# Patient Record
Sex: FEMALE | Race: WHITE | Employment: UNEMPLOYED | URBAN - METROPOLITAN AREA
[De-identification: names, ages, dates, MRNs, and addresses within clinical notes are randomized per-mention and may not be internally consistent; named-entity substitution may affect disease eponyms.]

---

## 2021-03-09 ENCOUNTER — OFFICE VISIT (OUTPATIENT)
Dept: FAMILY MEDICINE CLINIC | Facility: CLINIC | Age: 61
End: 2021-03-09
Payer: COMMERCIAL

## 2021-03-09 VITALS
WEIGHT: 119 LBS | HEIGHT: 63.5 IN | BODY MASS INDEX: 20.82 KG/M2 | OXYGEN SATURATION: 98 % | DIASTOLIC BLOOD PRESSURE: 64 MMHG | SYSTOLIC BLOOD PRESSURE: 120 MMHG | HEART RATE: 78 BPM

## 2021-03-09 DIAGNOSIS — M54.50 ACUTE RIGHT-SIDED LOW BACK PAIN WITHOUT SCIATICA: ICD-10-CM

## 2021-03-09 DIAGNOSIS — M50.30 DDD (DEGENERATIVE DISC DISEASE), CERVICAL: ICD-10-CM

## 2021-03-09 DIAGNOSIS — M79.18 CERVICAL MYOFASCIAL PAIN SYNDROME: Primary | ICD-10-CM

## 2021-03-09 PROCEDURE — 3078F DIAST BP <80 MM HG: CPT | Performed by: FAMILY MEDICINE

## 2021-03-09 PROCEDURE — 99204 OFFICE O/P NEW MOD 45 MIN: CPT | Performed by: FAMILY MEDICINE

## 2021-03-09 PROCEDURE — 3074F SYST BP LT 130 MM HG: CPT | Performed by: FAMILY MEDICINE

## 2021-03-09 PROCEDURE — 3008F BODY MASS INDEX DOCD: CPT | Performed by: FAMILY MEDICINE

## 2021-03-09 RX ORDER — PREGABALIN 75 MG/1
75 CAPSULE ORAL
COMMUNITY
End: 2021-03-09

## 2021-03-09 RX ORDER — PREGABALIN 75 MG/1
75 CAPSULE ORAL DAILY
Qty: 90 CAPSULE | Refills: 1 | Status: SHIPPED | OUTPATIENT
Start: 2021-03-09 | End: 2021-09-01

## 2021-03-09 NOTE — PROGRESS NOTES
HPI:    Patient ID: Avi Drew is a 56year old female who presents for refill of pregabalin. HPI  Started seeing Dr. Harold Mcardle in 2010 for adjustments. Then started [place] physiatrist [place] (Dr. Kin Sanches).   Was initially started on lyrica, t PRILOSEC OTC OR None Entered (Patient not taking:  )     • GOLYTELY 236 GM OR SOLR TAKE AS INSTRUCTED FROM OFFICE DIRECTIONS (Patient not taking: Reported on 3/9/2021) 4000 mL 0        Cipro Xr [Ciproflox*        Review of Systems   Constitutional: Chelo Lockhart Coordination: Coordination normal.      Gait: Gait normal.   Psychiatric:         Thought Content:  Thought content normal.         Judgment: Judgment normal.             ASSESSMENT/PLAN:   Cervical myofascial pain syndrome  (primary encounter diagnosis)  A

## 2021-03-10 ENCOUNTER — TELEPHONE (OUTPATIENT)
Dept: FAMILY MEDICINE CLINIC | Facility: CLINIC | Age: 61
End: 2021-03-10

## 2021-03-10 NOTE — TELEPHONE ENCOUNTER
Called pt and was informed of Advil 600 mg to 800 mg. Pt wants to know how often and for how long. Reviewed chart and not noted. Pt also wants a muscle relaxant therefore will forward to Dr. Nisha Bowman.   Informed pt that will route message to Dr. Nisha Bowman and

## 2021-03-10 NOTE — TELEPHONE ENCOUNTER
Was seen in office yesterday. Has questions about taking advil and how often she needs to take and for how long. Patient also feels she needs muscle relaxant.

## 2021-03-10 NOTE — TELEPHONE ENCOUNTER
Jamie Peña, DO  Leif 10 Dr. Johnnie Breaux 5 minutes ago (4:11 PM)     Advil: She can take 600-800 mg every 8 hours as needed.      Muscle relaxer: I would start with the regular advil first. Muscle relaxers often don't help too much and can make you over

## 2021-05-28 ENCOUNTER — OFFICE VISIT (OUTPATIENT)
Dept: FAMILY MEDICINE CLINIC | Facility: CLINIC | Age: 61
End: 2021-05-28
Payer: COMMERCIAL

## 2021-05-28 VITALS
OXYGEN SATURATION: 99 % | HEART RATE: 67 BPM | WEIGHT: 119 LBS | DIASTOLIC BLOOD PRESSURE: 60 MMHG | BODY MASS INDEX: 20.82 KG/M2 | HEIGHT: 63.5 IN | SYSTOLIC BLOOD PRESSURE: 120 MMHG

## 2021-05-28 DIAGNOSIS — Z12.31 ENCOUNTER FOR SCREENING MAMMOGRAM FOR MALIGNANT NEOPLASM OF BREAST: ICD-10-CM

## 2021-05-28 DIAGNOSIS — Z12.11 COLON CANCER SCREENING: ICD-10-CM

## 2021-05-28 DIAGNOSIS — Z80.0 FAMILY HISTORY OF COLON CANCER IN FATHER: ICD-10-CM

## 2021-05-28 DIAGNOSIS — R20.0 NUMBNESS AND TINGLING IN RIGHT HAND: Primary | ICD-10-CM

## 2021-05-28 DIAGNOSIS — R20.2 NUMBNESS AND TINGLING IN RIGHT HAND: Primary | ICD-10-CM

## 2021-05-28 PROCEDURE — 3074F SYST BP LT 130 MM HG: CPT | Performed by: FAMILY MEDICINE

## 2021-05-28 PROCEDURE — 3078F DIAST BP <80 MM HG: CPT | Performed by: FAMILY MEDICINE

## 2021-05-28 PROCEDURE — 3008F BODY MASS INDEX DOCD: CPT | Performed by: FAMILY MEDICINE

## 2021-05-28 PROCEDURE — 99214 OFFICE O/P EST MOD 30 MIN: CPT | Performed by: FAMILY MEDICINE

## 2021-05-28 NOTE — PROGRESS NOTES
HPI:    Patient ID: Nikole Cobos is a 56year old female who presents for numbness of right hand, colonoscopy, and mammogram.    HPI  Recently went to new doctor who filled in for Dr. Mallorie Guerrero. Saw him for right hand numbness and tingling.  This doc ordere Reported on 3/9/2021) 4000 mL 0        Cipro Xr [Ciproflox*        Review of Systems   Musculoskeletal: Positive for neck pain and neck stiffness. Neurological: Positive for numbness. Negative for weakness. All other systems reviewed and are negative. mammogram for malignant neoplasm of breast    1. Numbness and tingling in right hand  - EMG ([address]MON[address])  -Likely CTS. -Pt recently completed neuropathy & annual labs. Signed medical record release form today.  Will call pt once re

## 2021-06-01 PROBLEM — Z80.0 FAMILY HISTORY OF COLON CANCER IN FATHER: Status: ACTIVE | Noted: 2021-06-01

## 2021-06-04 ENCOUNTER — MED REC SCAN ONLY (OUTPATIENT)
Dept: FAMILY MEDICINE CLINIC | Facility: CLINIC | Age: 61
End: 2021-06-04

## 2021-07-24 ENCOUNTER — TELEPHONE (OUTPATIENT)
Dept: FAMILY MEDICINE CLINIC | Facility: CLINIC | Age: 61
End: 2021-07-24
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
## 2021-07-24 NOTE — TELEPHONE ENCOUNTER
Reviewed records from prior PCP and summarized below:      Normal pap in 04/2019. HepC Ab neg in 12/2018. Normal mammo 2016. MRI Right Shoulder 09/2013 w/ mild degenerative changes. MRI Cervical Spine 07/2010 w/ mild degenerative changes.

## 2021-08-03 ENCOUNTER — MED REC SCAN ONLY (OUTPATIENT)
Dept: FAMILY MEDICINE CLINIC | Facility: CLINIC | Age: 61
End: 2021-08-03

## 2021-09-01 RX ORDER — PREGABALIN 75 MG/1
75 CAPSULE ORAL DAILY
Qty: 90 CAPSULE | Refills: 0 | Status: SHIPPED | OUTPATIENT
Start: 2021-09-01 | End: 2021-12-01

## 2021-09-01 NOTE — TELEPHONE ENCOUNTER
A refill request was received for:  Requested Prescriptions     Pending Prescriptions Disp Refills   • PREGABALIN 75 MG Oral Cap [Pharmacy Med Name: Pregabalin 75 Mg Cap Nova] 90 capsule 0     Sig: Take 1 capsule (75 mg total) by mouth daily.      Last refi

## 2021-11-09 ENCOUNTER — PATIENT OUTREACH (OUTPATIENT)
Dept: FAMILY MEDICINE CLINIC | Facility: CLINIC | Age: 61
End: 2021-11-09

## 2021-12-01 RX ORDER — PREGABALIN 75 MG/1
75 CAPSULE ORAL DAILY
Qty: 90 CAPSULE | Refills: 0 | Status: SHIPPED | OUTPATIENT
Start: 2021-12-01 | End: 2022-03-02

## 2021-12-14 ENCOUNTER — OFFICE VISIT (OUTPATIENT)
Dept: FAMILY MEDICINE CLINIC | Facility: CLINIC | Age: 61
End: 2021-12-14
Payer: COMMERCIAL

## 2021-12-14 ENCOUNTER — LAB ENCOUNTER (OUTPATIENT)
Dept: LAB | Facility: REFERENCE LAB | Age: 61
End: 2021-12-14
Attending: FAMILY MEDICINE
Payer: COMMERCIAL

## 2021-12-14 VITALS
HEART RATE: 69 BPM | DIASTOLIC BLOOD PRESSURE: 58 MMHG | WEIGHT: 117 LBS | SYSTOLIC BLOOD PRESSURE: 122 MMHG | OXYGEN SATURATION: 98 % | BODY MASS INDEX: 20.47 KG/M2 | HEIGHT: 63.5 IN

## 2021-12-14 DIAGNOSIS — H93.12 TINNITUS OF LEFT EAR: ICD-10-CM

## 2021-12-14 DIAGNOSIS — R53.82 CHRONIC FATIGUE: Primary | ICD-10-CM

## 2021-12-14 DIAGNOSIS — R53.82 CHRONIC FATIGUE: ICD-10-CM

## 2021-12-14 DIAGNOSIS — F43.21 GRIEVING: ICD-10-CM

## 2021-12-14 DIAGNOSIS — Z78.9 VEGETARIAN: ICD-10-CM

## 2021-12-14 DIAGNOSIS — M65.341 TRIGGER RING FINGER OF RIGHT HAND: ICD-10-CM

## 2021-12-14 DIAGNOSIS — R20.0 NUMBNESS AND TINGLING IN RIGHT HAND: ICD-10-CM

## 2021-12-14 DIAGNOSIS — M79.18 CERVICAL MYOFASCIAL PAIN SYNDROME: ICD-10-CM

## 2021-12-14 DIAGNOSIS — R20.2 NUMBNESS AND TINGLING IN RIGHT HAND: ICD-10-CM

## 2021-12-14 PROCEDURE — 99214 OFFICE O/P EST MOD 30 MIN: CPT | Performed by: FAMILY MEDICINE

## 2021-12-14 PROCEDURE — 3078F DIAST BP <80 MM HG: CPT | Performed by: FAMILY MEDICINE

## 2021-12-14 PROCEDURE — 82607 VITAMIN B-12: CPT

## 2021-12-14 PROCEDURE — 84466 ASSAY OF TRANSFERRIN: CPT

## 2021-12-14 PROCEDURE — 3074F SYST BP LT 130 MM HG: CPT | Performed by: FAMILY MEDICINE

## 2021-12-14 PROCEDURE — 82746 ASSAY OF FOLIC ACID SERUM: CPT

## 2021-12-14 PROCEDURE — 83540 ASSAY OF IRON: CPT

## 2021-12-14 PROCEDURE — 82728 ASSAY OF FERRITIN: CPT

## 2021-12-14 PROCEDURE — 82306 VITAMIN D 25 HYDROXY: CPT

## 2021-12-14 PROCEDURE — 36415 COLL VENOUS BLD VENIPUNCTURE: CPT

## 2021-12-14 PROCEDURE — 3008F BODY MASS INDEX DOCD: CPT | Performed by: FAMILY MEDICINE

## 2021-12-14 PROCEDURE — 80050 GENERAL HEALTH PANEL: CPT | Performed by: FAMILY MEDICINE

## 2021-12-14 NOTE — PROGRESS NOTES
HPI:    Patient ID: Robert Simon is a 59year old female who presents for few concerns. HPI  Feel low mood. Low energy. Having brain fog and memory issues. Would like blood test.   Doesn't always sleep well. Wakes up in the middle of the night.  Can hav She is not in acute distress. Appearance: Normal appearance. She is well-developed. She is not ill-appearing, toxic-appearing or diaphoretic. HENT:      Head: Normocephalic and atraumatic.       Right Ear: Tympanic membrane, ear canal and external ear depressed mood. -Will check lab panel per request.   -Sees therapist once weekly. Will discuss pharmacotherapy pending labs. 2. Grieving  -Plan as above. 3. Numbness and tingling in right hand  -Chronic.  CTS vs cervical radiculopathy.   -Again enc Ambulatory

## 2022-01-07 ENCOUNTER — TELEPHONE (OUTPATIENT)
Dept: FAMILY MEDICINE CLINIC | Facility: CLINIC | Age: 62
End: 2022-01-07

## 2022-01-07 NOTE — TELEPHONE ENCOUNTER
Pt traveling to [place] in [place] and [facility] in February. Pt would like to know if there are any vaccinations or medications required for travel?

## 2022-01-10 NOTE — TELEPHONE ENCOUNTER
RN LMTCB with MP's message. ---------------------      Jose Jaeger 10 Dr. Elyse Wilhelm: Unspecified (3 days ago, 11:08 AM)  It will depend on where she is traveling to in [place] and [place].  The CDC lists all recommended vaccine

## 2022-01-14 ENCOUNTER — TELEPHONE (OUTPATIENT)
Dept: FAMILY MEDICINE CLINIC | Facility: CLINIC | Age: 62
End: 2022-01-14

## 2022-01-14 NOTE — TELEPHONE ENCOUNTER
The Insurance company needed the procedure code for an  EMG test ordered by Dr. Tamra Bowers. To check if the EMG is covered by the insurance.

## 2022-01-28 ENCOUNTER — TELEPHONE (OUTPATIENT)
Dept: FAMILY MEDICINE CLINIC | Facility: CLINIC | Age: 62
End: 2022-01-28

## 2022-01-28 NOTE — TELEPHONE ENCOUNTER
The patient is calling to inquire about a medication that her and the doctor discussed in December when she was there for an office visit. She only wanted to speak with Dr. Rula Holden and not the nurse.

## 2022-01-29 RX ORDER — FLUOXETINE 10 MG/1
10 TABLET, FILM COATED ORAL DAILY
Qty: 60 TABLET | Refills: 0 | Status: SHIPPED | OUTPATIENT
Start: 2022-01-29 | End: 2022-02-01

## 2022-01-29 NOTE — TELEPHONE ENCOUNTER
Spoke to pt over phone. Therapist recommends antidepressant. Pt has always been anxious. Was on prozac in early 80s and tolerated well. Does not want to get off lyrica. Will resume prozac and start 10mg daily. SE profile reviewed in detail.  RTC in 4-6 week

## 2022-02-01 ENCOUNTER — TELEPHONE (OUTPATIENT)
Dept: FAMILY MEDICINE CLINIC | Facility: CLINIC | Age: 62
End: 2022-02-01

## 2022-02-01 RX ORDER — FLUOXETINE 10 MG/1
10 CAPSULE ORAL DAILY
Qty: 60 CAPSULE | Refills: 0 | Status: SHIPPED | OUTPATIENT
Start: 2022-02-01

## 2022-02-01 NOTE — TELEPHONE ENCOUNTER
Medication prescribed by MP 1/29 - pt was informed by pharmacy that  Insurance will not cover it. Needs prior authorization.     FLUoxetine 10 MG Oral Tab    [id number] - [address] [phone], [phone]

## 2022-02-01 NOTE — TELEPHONE ENCOUNTER
Called [facility] and will cover capsules not tablets. Order placed with capsules. Called pt and LVM medication changed to capsules.

## 2022-02-14 ENCOUNTER — TELEPHONE (OUTPATIENT)
Dept: FAMILY MEDICINE CLINIC | Facility: CLINIC | Age: 62
End: 2022-02-14

## 2022-02-14 NOTE — TELEPHONE ENCOUNTER
Pt requesting referral for Mammogram be faxed to:    [facility]  [phone]  Fax: [phone]

## 2022-02-14 NOTE — TELEPHONE ENCOUNTER
Order was placed on 05/28/21 [facility]. Called pt and informed faxed order, per pt doing mammo tomorrow.

## 2022-02-19 ENCOUNTER — TELEPHONE (OUTPATIENT)
Dept: FAMILY MEDICINE CLINIC | Facility: CLINIC | Age: 62
End: 2022-02-19

## 2022-02-19 NOTE — TELEPHONE ENCOUNTER
Spoke to pt over phone and made aware of negative mammogram on 2/15/22 with recs to repeat in 1 year. Pt also due for pap smear in 04/2022 and encouraged to schedule appt at that time.

## 2022-03-02 RX ORDER — PREGABALIN 75 MG/1
75 CAPSULE ORAL DAILY
Qty: 90 CAPSULE | Refills: 0 | Status: SHIPPED | OUTPATIENT
Start: 2022-03-02

## 2022-03-14 ENCOUNTER — PROCEDURE VISIT (OUTPATIENT)
Dept: PHYSICAL MEDICINE AND REHAB | Facility: CLINIC | Age: 62
End: 2022-03-14
Payer: COMMERCIAL

## 2022-03-14 DIAGNOSIS — R20.2 NUMBNESS AND TINGLING IN RIGHT HAND: ICD-10-CM

## 2022-03-14 DIAGNOSIS — R20.0 NUMBNESS AND TINGLING IN RIGHT HAND: ICD-10-CM

## 2022-03-14 PROCEDURE — 95886 MUSC TEST DONE W/N TEST COMP: CPT | Performed by: PHYSICAL MEDICINE & REHABILITATION

## 2022-03-14 PROCEDURE — 95909 NRV CNDJ TST 5-6 STUDIES: CPT | Performed by: PHYSICAL MEDICINE & REHABILITATION

## 2022-03-29 ENCOUNTER — OFFICE VISIT (OUTPATIENT)
Dept: FAMILY MEDICINE CLINIC | Facility: CLINIC | Age: 62
End: 2022-03-29
Payer: COMMERCIAL

## 2022-03-29 VITALS — OXYGEN SATURATION: 98 % | SYSTOLIC BLOOD PRESSURE: 122 MMHG | DIASTOLIC BLOOD PRESSURE: 64 MMHG | HEART RATE: 88 BPM

## 2022-03-29 DIAGNOSIS — G89.29 CHRONIC BILATERAL LOW BACK PAIN WITHOUT SCIATICA: ICD-10-CM

## 2022-03-29 DIAGNOSIS — M54.50 CHRONIC BILATERAL LOW BACK PAIN WITHOUT SCIATICA: ICD-10-CM

## 2022-03-29 DIAGNOSIS — N39.3 STRESS INCONTINENCE: ICD-10-CM

## 2022-03-29 DIAGNOSIS — G56.01 CARPAL TUNNEL SYNDROME OF RIGHT WRIST: Primary | ICD-10-CM

## 2022-03-29 DIAGNOSIS — F32.A ANXIETY AND DEPRESSION: ICD-10-CM

## 2022-03-29 DIAGNOSIS — M65.341 TRIGGER RING FINGER OF RIGHT HAND: ICD-10-CM

## 2022-03-29 DIAGNOSIS — F41.9 ANXIETY AND DEPRESSION: ICD-10-CM

## 2022-03-29 PROCEDURE — 3078F DIAST BP <80 MM HG: CPT | Performed by: FAMILY MEDICINE

## 2022-03-29 PROCEDURE — 3074F SYST BP LT 130 MM HG: CPT | Performed by: FAMILY MEDICINE

## 2022-03-29 PROCEDURE — 99215 OFFICE O/P EST HI 40 MIN: CPT | Performed by: FAMILY MEDICINE

## 2022-03-30 PROBLEM — G89.29 CHRONIC BILATERAL LOW BACK PAIN WITHOUT SCIATICA: Status: ACTIVE | Noted: 2022-03-30

## 2022-03-30 PROBLEM — G56.01 CARPAL TUNNEL SYNDROME OF RIGHT WRIST: Status: ACTIVE | Noted: 2022-03-30

## 2022-03-30 PROBLEM — N39.3 STRESS INCONTINENCE: Status: ACTIVE | Noted: 2022-03-30

## 2022-03-30 PROBLEM — M54.50 CHRONIC BILATERAL LOW BACK PAIN WITHOUT SCIATICA: Status: ACTIVE | Noted: 2022-03-30

## 2022-05-02 ENCOUNTER — OFFICE VISIT (OUTPATIENT)
Dept: PHYSICAL MEDICINE AND REHAB | Facility: CLINIC | Age: 62
End: 2022-05-02
Payer: COMMERCIAL

## 2022-05-02 VITALS
OXYGEN SATURATION: 100 % | SYSTOLIC BLOOD PRESSURE: 120 MMHG | HEART RATE: 76 BPM | DIASTOLIC BLOOD PRESSURE: 74 MMHG | HEIGHT: 63 IN | BODY MASS INDEX: 20.91 KG/M2 | WEIGHT: 118 LBS

## 2022-05-02 DIAGNOSIS — M65.321 TRIGGER FINGER OF ALL DIGITS OF RIGHT HAND: ICD-10-CM

## 2022-05-02 DIAGNOSIS — M65.331 TRIGGER FINGER OF ALL DIGITS OF RIGHT HAND: ICD-10-CM

## 2022-05-02 DIAGNOSIS — M65.311 TRIGGER FINGER OF ALL DIGITS OF RIGHT HAND: ICD-10-CM

## 2022-05-02 DIAGNOSIS — G56.01 CARPAL TUNNEL SYNDROME OF RIGHT WRIST: Primary | ICD-10-CM

## 2022-05-02 DIAGNOSIS — M65.351 TRIGGER FINGER OF ALL DIGITS OF RIGHT HAND: ICD-10-CM

## 2022-05-02 DIAGNOSIS — M65.341 TRIGGER FINGER OF ALL DIGITS OF RIGHT HAND: ICD-10-CM

## 2022-05-02 NOTE — PATIENT INSTRUCTIONS
-Start occupational therapy and home exercises  -Voltaren gel 3-4 x daily   -Night splints for the next 4-6 weeks  -Ice/Heat as tolerated  -Please stop the medication if you have any side effects and call the office if you have any questions or concerns  -If no better will consider ultrasound guided injection  -Follow up in 4 weeks

## 2022-05-16 ENCOUNTER — TELEPHONE (OUTPATIENT)
Dept: FAMILY MEDICINE CLINIC | Facility: CLINIC | Age: 62
End: 2022-05-16

## 2022-05-16 NOTE — TELEPHONE ENCOUNTER
Agree with covid vaccine recs. As for the lyrica, we would slowly wean the dose (first decrease to 50mg daily x1 month, then 25mg daily x1 month). However, her carpal tunnel symptoms may worsen if we wean her off of the lyrica. Please let pt know. I can send the 50mg capsules to the pharmacy, but would want to see her for a follow-up in 1 month to assess her pain/symptoms prior to continuing to wean.

## 2022-05-17 RX ORDER — PREGABALIN 50 MG/1
50 CAPSULE ORAL DAILY
Qty: 30 CAPSULE | Refills: 0 | Status: SHIPPED | OUTPATIENT
Start: 2022-05-17

## 2022-05-17 NOTE — TELEPHONE ENCOUNTER
Advised patient of Dr. Vasyl Power note. Patient verbalized understanding. Rx pended for lyrica 50mg daily for 30 days.

## 2022-05-23 LAB — AMB EXT COVID-19 RESULT: DETECTED

## 2022-05-24 ENCOUNTER — TELEPHONE (OUTPATIENT)
Dept: FAMILY MEDICINE CLINIC | Facility: CLINIC | Age: 62
End: 2022-05-24

## 2022-05-24 NOTE — TELEPHONE ENCOUNTER
Patient tested positive via home rapid test yesterday. Chart update. Her symptoms started Sunday. She has body aches. Fatigue, cough, sputum. Reviewed home care recommendations. Relayed she can book a video visit if she would like to discuss antiviral or MAB treatment. She does not have many risk factors and has had 3 vaccines. Relayed she can go to  within first 5 days to see if she qualifies for MAB. Reviewed red flag symptoms to watch for of when to go to ER. She will take some Tylenol and call back with any questions.

## 2022-05-31 ENCOUNTER — TELEPHONE (OUTPATIENT)
Dept: FAMILY MEDICINE CLINIC | Facility: CLINIC | Age: 62
End: 2022-05-31

## 2022-05-31 NOTE — TELEPHONE ENCOUNTER
Patient reports she is on day 8 of having COVID, she feels like she has fully recovered. Denies any symptoms and is Afebrile. She is asking if she may resume exercise/running. Advised patient we would recommend she start out slow and then increase as tolerated. Patient also asking for a recovery letter for travel as she leaves for [facility] in 9 days. Dr. Rocco Flores: Delvin Ivey recovery letter for travel is pended in communications if okay.

## 2022-05-31 NOTE — TELEPHONE ENCOUNTER
Called patient and informed Alex Santoyo completed a letter for her and can be picked up at our .  Patient verbalized understanding

## 2022-06-28 ENCOUNTER — TELEPHONE (OUTPATIENT)
Dept: FAMILY MEDICINE CLINIC | Facility: CLINIC | Age: 62
End: 2022-06-28

## 2022-06-28 ENCOUNTER — TELEPHONE (OUTPATIENT)
Dept: NEUROLOGY | Facility: CLINIC | Age: 62
End: 2022-06-28

## 2022-06-28 RX ORDER — PREGABALIN 75 MG/1
75 CAPSULE ORAL DAILY
Qty: 90 CAPSULE | Refills: 1 | Status: SHIPPED | OUTPATIENT
Start: 2022-06-28

## 2022-06-28 RX ORDER — PREGABALIN 75 MG/1
75 CAPSULE ORAL DAILY
Qty: 90 CAPSULE | Refills: 0 | Status: CANCELLED | OUTPATIENT
Start: 2022-06-28

## 2022-06-28 NOTE — TELEPHONE ENCOUNTER
Dr. Christo Alcala:  Patient called seeking new RX for pregabalin 75mg capsules. Patient states she is trying to go back to taking pregabalin 75mg.   (she had 50mg tabs and 25mg tabs recently) now that she completed these capsules, she would like rx for 75mg capsules.

## 2022-06-28 NOTE — TELEPHONE ENCOUNTER
Received PT Initial Evaluation to be signed by physician.  Placed in [address] [facility] [employer] [facility]

## 2022-07-06 ENCOUNTER — MED REC SCAN ONLY (OUTPATIENT)
Dept: PHYSICAL MEDICINE AND REHAB | Facility: CLINIC | Age: 62
End: 2022-07-06

## 2022-08-10 ENCOUNTER — TELEPHONE (OUTPATIENT)
Dept: FAMILY MEDICINE CLINIC | Facility: CLINIC | Age: 62
End: 2022-08-10

## 2022-08-10 NOTE — TELEPHONE ENCOUNTER
Dr. Rocco Flores: Patient would like your feedback. 1) would you recommend she get covid booster vaccine now or should she wait until Fall season for the updated vaccine booster? She had Covid in May. She also had her 1st booster 12/4/21. 2) patient asked who you would recommend for MOHS surgery. Patient hx of basal skin carcinoma on face 6 years ago. (2016) she was to have MOHS procedure done and had to cancel.

## 2022-08-11 NOTE — TELEPHONE ENCOUNTER
I recommend getting the vaccine sooner rather than later. As for the MOHS procedure, I'm not sure which dermatologists performs this procedure. She can trial Dr. Dafne Stone or Dr. Edinson Eden (both in [place]). Thank you.

## 2022-08-11 NOTE — TELEPHONE ENCOUNTER
Spoke to patient and informed her that Nazia Berumen recommends she get the COVID booster vaccine sooner than later. Regarding MOHS procedure I explained to Winnie that Nazia Berumen isn't sure which dermatologists performs this procedure.  She said she will explore other options first and then call back if needed

## 2022-12-15 ENCOUNTER — LAB ENCOUNTER (OUTPATIENT)
Dept: LAB | Facility: REFERENCE LAB | Age: 62
End: 2022-12-15
Attending: FAMILY MEDICINE
Payer: COMMERCIAL

## 2022-12-15 ENCOUNTER — OFFICE VISIT (OUTPATIENT)
Dept: FAMILY MEDICINE CLINIC | Facility: CLINIC | Age: 62
End: 2022-12-15
Payer: COMMERCIAL

## 2022-12-15 VITALS
OXYGEN SATURATION: 98 % | WEIGHT: 118 LBS | SYSTOLIC BLOOD PRESSURE: 130 MMHG | DIASTOLIC BLOOD PRESSURE: 66 MMHG | BODY MASS INDEX: 20.91 KG/M2 | HEIGHT: 63 IN | HEART RATE: 68 BPM

## 2022-12-15 DIAGNOSIS — F41.9 ANXIETY AND DEPRESSION: ICD-10-CM

## 2022-12-15 DIAGNOSIS — Z00.00 PHYSICAL EXAM, ANNUAL: ICD-10-CM

## 2022-12-15 DIAGNOSIS — M79.18 CERVICAL MYOFASCIAL PAIN SYNDROME: ICD-10-CM

## 2022-12-15 DIAGNOSIS — N39.3 STRESS INCONTINENCE: ICD-10-CM

## 2022-12-15 DIAGNOSIS — R61 NIGHT SWEATS: ICD-10-CM

## 2022-12-15 DIAGNOSIS — Z00.00 PHYSICAL EXAM, ANNUAL: Primary | ICD-10-CM

## 2022-12-15 DIAGNOSIS — Z78.9 VEGETARIAN: ICD-10-CM

## 2022-12-15 DIAGNOSIS — Z12.31 ENCOUNTER FOR SCREENING MAMMOGRAM FOR MALIGNANT NEOPLASM OF BREAST: ICD-10-CM

## 2022-12-15 DIAGNOSIS — F32.A ANXIETY AND DEPRESSION: ICD-10-CM

## 2022-12-15 DIAGNOSIS — G56.01 CARPAL TUNNEL SYNDROME OF RIGHT WRIST: ICD-10-CM

## 2022-12-15 LAB
ALBUMIN SERPL-MCNC: 4.4 G/DL (ref 3.4–5)
ALBUMIN/GLOB SERPL: 1.3 {RATIO} (ref 1–2)
ALP LIVER SERPL-CCNC: 94 U/L
ALT SERPL-CCNC: 26 U/L
ANION GAP SERPL CALC-SCNC: 5 MMOL/L (ref 0–18)
AST SERPL-CCNC: 24 U/L (ref 15–37)
BASOPHILS # BLD AUTO: 0.05 X10(3) UL (ref 0–0.2)
BASOPHILS NFR BLD AUTO: 0.8 %
BILIRUB SERPL-MCNC: 0.5 MG/DL (ref 0.1–2)
BUN BLD-MCNC: 16 MG/DL (ref 7–18)
BUN/CREAT SERPL: 20 (ref 10–20)
CALCIUM BLD-MCNC: 9.6 MG/DL (ref 8.5–10.1)
CHLORIDE SERPL-SCNC: 105 MMOL/L (ref 98–112)
CHOLEST SERPL-MCNC: 181 MG/DL (ref ?–200)
CO2 SERPL-SCNC: 30 MMOL/L (ref 21–32)
CREAT BLD-MCNC: 0.8 MG/DL
DEPRECATED RDW RBC AUTO: 40.8 FL (ref 35.1–46.3)
EOSINOPHIL # BLD AUTO: 0.09 X10(3) UL (ref 0–0.7)
EOSINOPHIL NFR BLD AUTO: 1.5 %
ERYTHROCYTE [DISTWIDTH] IN BLOOD BY AUTOMATED COUNT: 12.2 % (ref 11–15)
EST. AVERAGE GLUCOSE BLD GHB EST-MCNC: 123 MG/DL (ref 68–126)
FASTING PATIENT LIPID ANSWER: NO
FASTING STATUS PATIENT QL REPORTED: NO
GFR SERPLBLD BASED ON 1.73 SQ M-ARVRAT: 82 ML/MIN/1.73M2 (ref 60–?)
GLOBULIN PLAS-MCNC: 3.4 G/DL (ref 2.8–4.4)
GLUCOSE BLD-MCNC: 90 MG/DL (ref 70–99)
HBA1C MFR BLD: 5.9 % (ref ?–5.7)
HCT VFR BLD AUTO: 38 %
HCV AB SERPL QL IA: NONREACTIVE
HDLC SERPL-MCNC: 65 MG/DL (ref 40–59)
HGB BLD-MCNC: 12.5 G/DL
IMM GRANULOCYTES # BLD AUTO: 0.01 X10(3) UL (ref 0–1)
IMM GRANULOCYTES NFR BLD: 0.2 %
LDLC SERPL CALC-MCNC: 105 MG/DL (ref ?–100)
LYMPHOCYTES # BLD AUTO: 2.14 X10(3) UL (ref 1–4)
LYMPHOCYTES NFR BLD AUTO: 34.6 %
MCH RBC QN AUTO: 30 PG (ref 26–34)
MCHC RBC AUTO-ENTMCNC: 32.9 G/DL (ref 31–37)
MCV RBC AUTO: 91.1 FL
MONOCYTES # BLD AUTO: 0.41 X10(3) UL (ref 0.1–1)
MONOCYTES NFR BLD AUTO: 6.6 %
NEUTROPHILS # BLD AUTO: 3.48 X10 (3) UL (ref 1.5–7.7)
NEUTROPHILS # BLD AUTO: 3.48 X10(3) UL (ref 1.5–7.7)
NEUTROPHILS NFR BLD AUTO: 56.3 %
NONHDLC SERPL-MCNC: 116 MG/DL (ref ?–130)
OSMOLALITY SERPL CALC.SUM OF ELEC: 291 MOSM/KG (ref 275–295)
PLATELET # BLD AUTO: 259 10(3)UL (ref 150–450)
POTASSIUM SERPL-SCNC: 3.7 MMOL/L (ref 3.5–5.1)
PROT SERPL-MCNC: 7.8 G/DL (ref 6.4–8.2)
RBC # BLD AUTO: 4.17 X10(6)UL
SODIUM SERPL-SCNC: 140 MMOL/L (ref 136–145)
TRIGL SERPL-MCNC: 57 MG/DL (ref 30–149)
TSI SER-ACNC: 3.04 MIU/ML (ref 0.36–3.74)
VIT B12 SERPL-MCNC: 845 PG/ML (ref 193–986)
VLDLC SERPL CALC-MCNC: 10 MG/DL (ref 0–30)
WBC # BLD AUTO: 6.2 X10(3) UL (ref 4–11)

## 2022-12-15 PROCEDURE — 82607 VITAMIN B-12: CPT

## 2022-12-15 PROCEDURE — 3008F BODY MASS INDEX DOCD: CPT | Performed by: FAMILY MEDICINE

## 2022-12-15 PROCEDURE — 80061 LIPID PANEL: CPT | Performed by: FAMILY MEDICINE

## 2022-12-15 PROCEDURE — 99396 PREV VISIT EST AGE 40-64: CPT | Performed by: FAMILY MEDICINE

## 2022-12-15 PROCEDURE — 3075F SYST BP GE 130 - 139MM HG: CPT | Performed by: FAMILY MEDICINE

## 2022-12-15 PROCEDURE — 86480 TB TEST CELL IMMUN MEASURE: CPT

## 2022-12-15 PROCEDURE — 84443 ASSAY THYROID STIM HORMONE: CPT | Performed by: FAMILY MEDICINE

## 2022-12-15 PROCEDURE — 85025 COMPLETE CBC W/AUTO DIFF WBC: CPT | Performed by: FAMILY MEDICINE

## 2022-12-15 PROCEDURE — 36415 COLL VENOUS BLD VENIPUNCTURE: CPT

## 2022-12-15 PROCEDURE — 86803 HEPATITIS C AB TEST: CPT | Performed by: FAMILY MEDICINE

## 2022-12-15 PROCEDURE — 80053 COMPREHEN METABOLIC PANEL: CPT | Performed by: FAMILY MEDICINE

## 2022-12-15 PROCEDURE — 87389 HIV-1 AG W/HIV-1&-2 AB AG IA: CPT | Performed by: FAMILY MEDICINE

## 2022-12-15 PROCEDURE — 99214 OFFICE O/P EST MOD 30 MIN: CPT | Performed by: FAMILY MEDICINE

## 2022-12-15 PROCEDURE — 83036 HEMOGLOBIN GLYCOSYLATED A1C: CPT | Performed by: FAMILY MEDICINE

## 2022-12-15 PROCEDURE — 3078F DIAST BP <80 MM HG: CPT | Performed by: FAMILY MEDICINE

## 2022-12-15 RX ORDER — FLUOXETINE 10 MG/1
10 CAPSULE ORAL DAILY
Qty: 60 CAPSULE | Refills: 0 | Status: SHIPPED | OUTPATIENT
Start: 2022-12-15

## 2022-12-19 LAB
M TB IFN-G CD4+ T-CELLS BLD-ACNC: 0 IU/ML
M TB TUBERC IFN-G BLD QL: NEGATIVE
M TB TUBERC IGNF/MITOGEN IGNF CONTROL: >10 IU/ML
QFT TB1 AG MINUS NIL: 0 IU/ML
QFT TB2 AG MINUS NIL: 0 IU/ML

## 2022-12-21 ENCOUNTER — TELEPHONE (OUTPATIENT)
Dept: FAMILY MEDICINE CLINIC | Facility: CLINIC | Age: 62
End: 2022-12-21

## 2022-12-21 NOTE — TELEPHONE ENCOUNTER
Patient is calling stating she would like to speak to MP about her recent test results. Please follow up with patient.

## 2022-12-26 RX ORDER — PREGABALIN 75 MG/1
75 CAPSULE ORAL DAILY
Qty: 90 CAPSULE | Refills: 0 | Status: SHIPPED | OUTPATIENT
Start: 2022-12-26

## 2022-12-26 NOTE — TELEPHONE ENCOUNTER
Please review. Protocol Failed or has No Protocol. Requested Prescriptions   Pending Prescriptions Disp Refills    PREGABALIN 75 MG Oral Cap [Pharmacy Med Name: Pregabalin 75 Mg Cap [place]] 90 capsule 0     Sig: Take 1 capsule (75 mg total) by mouth daily.        There is no refill protocol information for this order          Future Appointments         Provider Department Appt Notes    In 1 month Amada Martinez, [address] [facility], [facility], [facility] cln screening             Recent Outpatient Visits              1 week ago Physical exam, annual    [address], DO    Office Visit    7 months ago Carpal tunnel syndrome of right wrist    [facility] Jyoti Gallegos, DO    Office Visit    9 months ago Carpal tunnel syndrome of right wrist    [address] Yohan Schroeder, DO    Office Visit    1 year ago Chronic fatigue    [address] Isela Keita, DO    Office Visit    1 year ago Numbness and tingling in right hand    [address] [facility] Isela Keita, [place]    Office Visit

## 2022-12-30 ENCOUNTER — TELEPHONE (OUTPATIENT)
Dept: FAMILY MEDICINE CLINIC | Facility: CLINIC | Age: 62
End: 2022-12-30

## 2022-12-30 NOTE — TELEPHONE ENCOUNTER
Verified name and . Patient was seen in office on 12/15/22- prescribed Fluoxetine- she states she has not taken this medication because she is worried about the side effects that she read. She is asking if there is any other alternative she can try for anxiety and also for any nonpharmacologic recommendations. Please advise and thank you.

## 2022-12-31 NOTE — TELEPHONE ENCOUNTER
The other options would be effexor, zoloft, or lexapro but all have similar side effect profiles to fluoxetine. Buspirone is another option. In terms on nonpharmacologic options, she could see integrative medicine.

## 2022-12-31 NOTE — TELEPHONE ENCOUNTER
Called patient, confirmed name and . She states that she would like to hold-off on a daily medication at this time. She is interested in buspirone and will research it. She is also interested in integrative medicine. She asks if you recommend both and is willing to schedule a visit if needed to discuss more.

## 2023-01-02 NOTE — TELEPHONE ENCOUNTER
For integrative medicine, I recommend Dr. Elisha Moralez. If she decides she wants to trial buspirone, she can call back and I will send rx to pharmacy. Thank you.

## 2023-01-03 NOTE — TELEPHONE ENCOUNTER
Spoke with Dr. Puga  States she would like her to have a breast ultrasound, not the soft tissue u/s  Order placed  Dr. Puga advised they may demand a mammogram before the u/s  Called pt back and informed of what was being ordered   Spoke with pt,  verified  Pt was informed of MD recommendation, pt stated understanding. She will call back if she need f/u appt.             Future Appointments   Date Time Provider Casper Young   2023  3:45 PM Cal Huff MD [address] [facility]

## 2023-01-26 ENCOUNTER — OFFICE VISIT (OUTPATIENT)
Dept: GASTROENTEROLOGY | Facility: CLINIC | Age: 63
End: 2023-01-26

## 2023-01-26 ENCOUNTER — TELEPHONE (OUTPATIENT)
Dept: GASTROENTEROLOGY | Facility: CLINIC | Age: 63
End: 2023-01-26

## 2023-01-26 VITALS
BODY MASS INDEX: 20.91 KG/M2 | WEIGHT: 118 LBS | HEIGHT: 63 IN | SYSTOLIC BLOOD PRESSURE: 146 MMHG | DIASTOLIC BLOOD PRESSURE: 75 MMHG | HEART RATE: 85 BPM

## 2023-01-26 DIAGNOSIS — R14.0 ABDOMINAL BLOATING: ICD-10-CM

## 2023-01-26 DIAGNOSIS — Z12.11 SCREEN FOR COLON CANCER: Primary | ICD-10-CM

## 2023-01-26 DIAGNOSIS — Z12.11 COLON CANCER SCREENING: Primary | ICD-10-CM

## 2023-01-26 PROCEDURE — S0285 CNSLT BEFORE SCREEN COLONOSC: HCPCS | Performed by: INTERNAL MEDICINE

## 2023-01-26 PROCEDURE — 3078F DIAST BP <80 MM HG: CPT | Performed by: INTERNAL MEDICINE

## 2023-01-26 PROCEDURE — 3008F BODY MASS INDEX DOCD: CPT | Performed by: INTERNAL MEDICINE

## 2023-01-26 PROCEDURE — 3077F SYST BP >= 140 MM HG: CPT | Performed by: INTERNAL MEDICINE

## 2023-01-26 RX ORDER — SODIUM, POTASSIUM,MAG SULFATES 17.5-3.13G
SOLUTION, RECONSTITUTED, ORAL ORAL
Qty: 1 EACH | Refills: 0 | Status: SHIPPED | OUTPATIENT
Start: 2023-01-26

## 2023-01-26 NOTE — PATIENT INSTRUCTIONS
1. Schedule colonoscopy with [facility] [Diagnosis: screening]    2.  bowel prep from pharmacy (split dose suprep)  --Buy over the counter dulcolax laxative, and take one tablet daily for 3 days prior to drinking the bowel prep. 3. Continue all medications as normal for your procedure. 4. Read all bowel prep instructions carefully. Bowel prep instructions can also be found online at:  www.health.org/giprep     5. AVOID seeds, nuts, popcorn, raw fruits and vegetables for 3 days before procedure    6. You MAY need to go for COVID testing 72 hours before procedure. The testing team will call you a few days before your procedure to discuss with you if testing is required. If you are asked to go for COVID testing and do not completed the test, the procedure cannot be performed. 7. If you start any NEW medication after your visit today, please notify us. Certain medications (like iron or weight loss medications) will need to be held before the procedure, or the procedure cannot be performed safely.      8. Low FODMAP diet

## 2023-01-26 NOTE — TELEPHONE ENCOUNTER
Scheduled for: Colonoscopy [id number]  Provider Name: Dr Latha Leigh   Date: Wed 4/19/2023   Location: [facility]   Sedation: [facility]   Time: 11:15 am   Prep: split colyte   Meds/Allergies Reconciled?: reviewed by provider    Diagnosis with codes: CRC screening Z12.11  Was patient informed to call insurance with codes (Y/N): Yes    Referral sent?: Yes   [facility] or [address] notified?: I sent an electronic request to Endo Scheduling and received a confirmation today. Medication Orders: --Buy over the counter dulcolax laxative, and take one tablet daily for 3 days prior to drinking the bowel prep. Pt is aware to NOT take iron pills, herbal meds and diet supplements for 7 days before exam. Also to NOT take any form of alcohol, recreational drugs and any forms of ED meds 24 hours before exam.      Misc Orders:       Further instructions given by staff:  Instructions given in office and pt verbalized understanding

## 2023-02-01 ENCOUNTER — TELEPHONE (OUTPATIENT)
Facility: CLINIC | Age: 63
End: 2023-02-01

## 2023-02-01 NOTE — TELEPHONE ENCOUNTER
Pt is looking for a new mammogram order to be sent to [facility] since the current one is only for Jana Atalissa is scheduled for 2/4/23.     Fax    [phone]

## 2023-02-06 ENCOUNTER — TELEPHONE (OUTPATIENT)
Facility: CLINIC | Age: 63
End: 2023-02-06

## 2023-02-06 DIAGNOSIS — K13.0 LIP LESION: Primary | ICD-10-CM

## 2023-02-06 NOTE — TELEPHONE ENCOUNTER
The patient wanted to speak with the doctor about treatment for Basal cell cancer on her face. She wants his expertise on the matter. Maybe a referral as well.

## 2023-02-06 NOTE — TELEPHONE ENCOUNTER
Spoke to pt over phone. Has MOHS surgery scheduled. May need to see plastic surgeon after her surgery depending on how large the lip lesion is. Plastic surgery referral generated and info provided.

## 2023-02-07 ENCOUNTER — MED REC SCAN ONLY (OUTPATIENT)
Facility: CLINIC | Age: 63
End: 2023-02-07

## 2023-02-20 ENCOUNTER — TELEPHONE (OUTPATIENT)
Dept: SURGERY | Facility: CLINIC | Age: 63
End: 2023-02-20

## 2023-02-20 NOTE — TELEPHONE ENCOUNTER
YOMIM with Dr. Elissa Lr office to coordinate [facility] procedure and closure with Dr. Holland Manriquez. (okay per Dr. Holland Manriquez). Patient diagnosis is BCCa (nodular type) left upper lip.

## 2023-02-24 ENCOUNTER — TELEPHONE (OUTPATIENT)
Dept: SURGERY | Facility: CLINIC | Age: 63
End: 2023-02-24

## 2023-02-24 ENCOUNTER — DOCUMENTATION ONLY (OUTPATIENT)
Dept: SURGERY | Facility: CLINIC | Age: 63
End: 2023-02-24

## 2023-02-24 DIAGNOSIS — C44.01 BASAL CELL CARCINOMA (BCC) OF SKIN OF LEFT UPPER LIP: Primary | ICD-10-CM

## 2023-02-24 NOTE — PATIENT INSTRUCTIONS
Surgeon:              Dr. Mayco Garrett. Nenita Castro, PhD                                          Tel:         [phone]                                  Fax:        [phone]    Surgery/Procedure: Reconstruction of the left upper lip with local tissue rearrangement, possible local flap, possible integra, possible skin graft, 2 hours, general anesthesia, outpatient   04/13/2023 (Mohs procedure with Roselind Erps on 04/12/2023)   AND 3-4 weeks later:  Second stage left upper lip reconstruction with skin graft, 2 hours, general anesthesia, outpatient                                           Hospital:  [facility]: [address] [place] [address], [place], [address]           [phone]  [facility]: [address] [facility], [facility]               [phone]    1. Someone will need to drive you to and from the hospital if your procedure is outpatient. 2.Do not drink alcohol or smoke 24 hours prior to your procedure. 3. Bring a picture ID and your insurance card. 4. You will be contacted by the hospital the day before to confirm the procedure time and location. 5. The hospital will also contact you approximately one week before surgery to schedule your COVID test.     6. Do not take any herbal supplements or blood thinners at least one week before your procedure/surgery. This includes NSAID's (aspirin, baby aspirin, Motrin, Ibuprofen, Aleve, Advil, Naproxen, etc), Plavix, fish oil, vitamin E, turmeric, CoQ10, or green tea supplements, etc. *TYLENOL or acetaminophen is ok to take*    7. PRE-OPERATIVE TESTING: History and physical with medical clearance is REQUIRED within 30 days of the surgery date and is mandatory per Dr. Nenita Castro. *If this is not done, your surgery will be postponed*  MEDICAL CLEARANCE WITH  ____  CBC  CMP  EKG    8. Please inform us if you develop any Covid-19 like symptoms, test positive or have been exposed for Covid- 19 prior to surgery.      Consent obtained __________  Photos taken on _________

## 2023-02-24 NOTE — TELEPHONE ENCOUNTER
Call made to Dr. Hodan Peace office to confirm dates that were held (04/12/2023 Mohs procedure and reconstruction on 04/13/2023). Spoke with  and was informed BRUCE Zimmerman is unavailable today. Will attempt 2/27/23 to call again.

## 2023-02-24 NOTE — TELEPHONE ENCOUNTER
Spoke with patient to discuss coordinated Mohs procedure dates and confirmed dates with the patient will work. Mohs procedure with Dr. Mitchell Valentino on 04/12/2023 with reconstruction with Dr. Deborah Schaefer on 04/13/2023. Patient informed she will need medical clearance for this procedure and was encouraged to make an appointment with her primary care physician within 30 days of the surgery date. Patient also informed we will schedule two surgery dates with Dr. Deborah Schaefer in case she needs a two stage reconstruction. Patient verbalized an understanding of all topics discussed and has no further questions at this time.

## 2023-03-24 ENCOUNTER — OFFICE VISIT (OUTPATIENT)
Dept: SURGERY | Facility: CLINIC | Age: 63
End: 2023-03-24
Payer: COMMERCIAL

## 2023-03-24 DIAGNOSIS — C44.310 BCC (BASAL CELL CARCINOMA), FACE: Primary | ICD-10-CM

## 2023-03-24 PROCEDURE — 88305 TISSUE EXAM BY PATHOLOGIST: CPT | Performed by: SURGERY

## 2023-03-27 ENCOUNTER — TELEPHONE (OUTPATIENT)
Dept: SURGERY | Facility: CLINIC | Age: 63
End: 2023-03-27

## 2023-03-27 RX ORDER — PREGABALIN 75 MG/1
75 CAPSULE ORAL DAILY
Qty: 90 CAPSULE | Refills: 0 | Status: SHIPPED | OUTPATIENT
Start: 2023-03-27

## 2023-03-27 NOTE — TELEPHONE ENCOUNTER
Attempted to call patient regarding pathology results. Patient does not allow the office to leave messages, will attempt again at a later time.

## 2023-03-29 ENCOUNTER — TELEPHONE (OUTPATIENT)
Dept: SURGERY | Facility: CLINIC | Age: 63
End: 2023-03-29

## 2023-03-29 NOTE — TELEPHONE ENCOUNTER
I called patient to discuss pathology results. She would prefer to discuss in person on Friday. She confirmed appointment for Friday.

## 2023-03-31 ENCOUNTER — OFFICE VISIT (OUTPATIENT)
Dept: SURGERY | Facility: CLINIC | Age: 63
End: 2023-03-31
Payer: COMMERCIAL

## 2023-03-31 DIAGNOSIS — C44.01 BASAL CELL CARCINOMA (BCC) OF SKIN OF LEFT UPPER LIP: Primary | ICD-10-CM

## 2023-03-31 NOTE — PATIENT INSTRUCTIONS
Surgeon:              Dr. Flores Mckenna. Vaughn Castañeda, PhD                                          Tel:         [phone]                                  Fax:        [phone]    Surgery/Procedure:  Reconstruction of the left upper lip with local tissue rearrangement, possible local flap, possible integra, possible skin graft, 2 hours, general anesthesia, outpatient   04/13/2023 (Mohs procedure with Rafat Hernandez on 04/12/2023)   AND 3-4 weeks later:  Second stage left upper lip reconstruction with skin graft, 2 hours, general anesthesia, outpatient                                          Hospital:  [facility]: [address] [place], [place] [address], [place], [address]           [phone]  [facility]: [address] [facility], [facility]               [phone]    1. Someone will need to drive you to and from the hospital if your procedure is outpatient. 2.Do not drink alcohol or smoke 24 hours prior to your procedure. 3. Bring a picture ID and your insurance card. 4. You will be contacted by the hospital the day before to confirm the procedure time and location. 5. The hospital will also contact you approximately one week before surgery to schedule your COVID test.     6. Do not take any herbal supplements or blood thinners at least one week before your procedure/surgery. This includes NSAID's (aspirin, baby aspirin, Motrin, Ibuprofen, Aleve, Advil, Naproxen, etc), Plavix, fish oil, vitamin E, turmeric, CoQ10, or green tea supplements, etc. *TYLENOL or acetaminophen is ok to take*    7. PRE-OPERATIVE TESTING: History and physical with medical clearance is REQUIRED within 30 days of the surgery date and is mandatory per Dr. Vaughn Castañeda. *If this is not done, your surgery will be postponed*  MEDICAL CLEARANCE WITH DR. Marisol Gore  CBC  CMP  EKG    8. Please inform us if you develop any Covid-19 like symptoms, test positive or have been exposed for Covid- 19 prior to surgery.      Consent obtained 03/31/2023  Photos taken on 03/31/2023

## 2023-04-03 ENCOUNTER — TELEPHONE (OUTPATIENT)
Dept: SURGERY | Facility: CLINIC | Age: 63
End: 2023-04-03

## 2023-04-03 ENCOUNTER — TELEPHONE (OUTPATIENT)
Facility: CLINIC | Age: 63
End: 2023-04-03

## 2023-04-03 NOTE — TELEPHONE ENCOUNTER
Please add on for this Friday at [facility]. Will complete labs and EKG at time of visit. Please have her bring paperwork to appt. Thank you.

## 2023-04-03 NOTE — TELEPHONE ENCOUNTER
LVM to Dr. Siva Brandon team to relay that Dr. Riley Mueller should start slow and small and to fax pathology results.

## 2023-04-03 NOTE — TELEPHONE ENCOUNTER
Returning pt's call regarding her upcoming procedure with Dr. Tre Osborne. Told pt that all the codes were verified through her insurance online portal and no prior authorization is required. Pt was given the phone number to the anesthesia group by the price estimation team to ensure the anesthesia group is in her network. Let pt know she is scheduled for 4/13/23 at [facility] as she thought it was to be done at [facility]. Let patient know it is in fact at [facility] in [place]. Pt had no other questions for me and was appreciative of my help. Pt did have some questions regarding the pre op process and form for her pcp, so I spoke to [employer] (Joaquin's RN) who stated she will call the patient to go over those details.

## 2023-04-03 NOTE — TELEPHONE ENCOUNTER
Spoke with patient to confirm surgery location and informed her our office will check with her insurance for approval and will let her know of any issues. We also discussed her medical clearance with her PCP. patient verbalized understanding and was appreciative of the call.

## 2023-04-03 NOTE — TELEPHONE ENCOUNTER
Pt is calling requesting a earlier appt for surgery clearance, pt stated she has surgery on 4/13/2023. And have to have blood tests and ekg done as well. The earliest appt was 4/11/2023. That was offered. Pt stated she needs on this week. Please advise.

## 2023-04-04 NOTE — TELEPHONE ENCOUNTER
Spoke with BRUCE Hanley at [facility]. Informed Layla that Dr. Betina Carvajal should start slow and small during the Mohs procedure. Also informed Layla of the punch biopsy Dr. Johana Hadley did and faxed pathology results to their office.

## 2023-04-06 DIAGNOSIS — C44.01 BASAL CELL CARCINOMA (BCC) OF SKIN OF LEFT UPPER LIP: Primary | ICD-10-CM

## 2023-04-07 ENCOUNTER — LAB ENCOUNTER (OUTPATIENT)
Dept: LAB | Facility: REFERENCE LAB | Age: 63
End: 2023-04-07
Attending: FAMILY MEDICINE
Payer: COMMERCIAL

## 2023-04-07 ENCOUNTER — OFFICE VISIT (OUTPATIENT)
Facility: CLINIC | Age: 63
End: 2023-04-07
Payer: COMMERCIAL

## 2023-04-07 ENCOUNTER — EKG ENCOUNTER (OUTPATIENT)
Dept: LAB | Age: 63
End: 2023-04-07
Attending: FAMILY MEDICINE
Payer: COMMERCIAL

## 2023-04-07 ENCOUNTER — TELEPHONE (OUTPATIENT)
Dept: SURGERY | Facility: CLINIC | Age: 63
End: 2023-04-07

## 2023-04-07 VITALS
HEART RATE: 70 BPM | WEIGHT: 119 LBS | DIASTOLIC BLOOD PRESSURE: 60 MMHG | SYSTOLIC BLOOD PRESSURE: 100 MMHG | TEMPERATURE: 98 F | BODY MASS INDEX: 21.09 KG/M2 | HEIGHT: 63 IN

## 2023-04-07 DIAGNOSIS — C44.01 BASAL CELL CARCINOMA (BCC) OF SKIN OF LEFT UPPER LIP: ICD-10-CM

## 2023-04-07 DIAGNOSIS — Z01.818 PREOPERATIVE CLEARANCE: Primary | ICD-10-CM

## 2023-04-07 DIAGNOSIS — Z01.818 PREOPERATIVE CLEARANCE: ICD-10-CM

## 2023-04-07 LAB
ALBUMIN SERPL-MCNC: 3.9 G/DL (ref 3.4–5)
ALBUMIN/GLOB SERPL: 1.2 {RATIO} (ref 1–2)
ALP LIVER SERPL-CCNC: 82 U/L
ALT SERPL-CCNC: 23 U/L
ANION GAP SERPL CALC-SCNC: 4 MMOL/L (ref 0–18)
AST SERPL-CCNC: 23 U/L (ref 15–37)
ATRIAL RATE: 68 BPM
BASOPHILS # BLD AUTO: 0.05 X10(3) UL (ref 0–0.2)
BASOPHILS NFR BLD AUTO: 1 %
BILIRUB SERPL-MCNC: 0.5 MG/DL (ref 0.1–2)
BUN BLD-MCNC: 13 MG/DL (ref 7–18)
BUN/CREAT SERPL: 16.5 (ref 10–20)
CALCIUM BLD-MCNC: 9.3 MG/DL (ref 8.5–10.1)
CHLORIDE SERPL-SCNC: 109 MMOL/L (ref 98–112)
CO2 SERPL-SCNC: 31 MMOL/L (ref 21–32)
CREAT BLD-MCNC: 0.79 MG/DL
DEPRECATED RDW RBC AUTO: 40.2 FL (ref 35.1–46.3)
EOSINOPHIL # BLD AUTO: 0.07 X10(3) UL (ref 0–0.7)
EOSINOPHIL NFR BLD AUTO: 1.4 %
ERYTHROCYTE [DISTWIDTH] IN BLOOD BY AUTOMATED COUNT: 12.2 % (ref 11–15)
FASTING STATUS PATIENT QL REPORTED: NO
GFR SERPLBLD BASED ON 1.73 SQ M-ARVRAT: 83 ML/MIN/1.73M2 (ref 60–?)
GLOBULIN PLAS-MCNC: 3.3 G/DL (ref 2.8–4.4)
GLUCOSE BLD-MCNC: 108 MG/DL (ref 70–99)
HCT VFR BLD AUTO: 34.9 %
HGB BLD-MCNC: 11.7 G/DL
IMM GRANULOCYTES # BLD AUTO: 0.01 X10(3) UL (ref 0–1)
IMM GRANULOCYTES NFR BLD: 0.2 %
LYMPHOCYTES # BLD AUTO: 2.09 X10(3) UL (ref 1–4)
LYMPHOCYTES NFR BLD AUTO: 40.5 %
MCH RBC QN AUTO: 30 PG (ref 26–34)
MCHC RBC AUTO-ENTMCNC: 33.5 G/DL (ref 31–37)
MCV RBC AUTO: 89.5 FL
MONOCYTES # BLD AUTO: 0.43 X10(3) UL (ref 0.1–1)
MONOCYTES NFR BLD AUTO: 8.3 %
NEUTROPHILS # BLD AUTO: 2.51 X10 (3) UL (ref 1.5–7.7)
NEUTROPHILS # BLD AUTO: 2.51 X10(3) UL (ref 1.5–7.7)
NEUTROPHILS NFR BLD AUTO: 48.6 %
OSMOLALITY SERPL CALC.SUM OF ELEC: 299 MOSM/KG (ref 275–295)
P AXIS: 8 DEGREES
P-R INTERVAL: 134 MS
PLATELET # BLD AUTO: 225 10(3)UL (ref 150–450)
POTASSIUM SERPL-SCNC: 3.9 MMOL/L (ref 3.5–5.1)
PROT SERPL-MCNC: 7.2 G/DL (ref 6.4–8.2)
Q-T INTERVAL: 412 MS
QRS DURATION: 80 MS
QTC CALCULATION (BEZET): 438 MS
R AXIS: 71 DEGREES
RBC # BLD AUTO: 3.9 X10(6)UL
SODIUM SERPL-SCNC: 144 MMOL/L (ref 136–145)
T AXIS: 26 DEGREES
VENTRICULAR RATE: 68 BPM
WBC # BLD AUTO: 5.2 X10(3) UL (ref 4–11)

## 2023-04-07 PROCEDURE — 3078F DIAST BP <80 MM HG: CPT | Performed by: FAMILY MEDICINE

## 2023-04-07 PROCEDURE — 3008F BODY MASS INDEX DOCD: CPT | Performed by: FAMILY MEDICINE

## 2023-04-07 PROCEDURE — 93005 ELECTROCARDIOGRAM TRACING: CPT

## 2023-04-07 PROCEDURE — 3074F SYST BP LT 130 MM HG: CPT | Performed by: FAMILY MEDICINE

## 2023-04-07 PROCEDURE — 99213 OFFICE O/P EST LOW 20 MIN: CPT | Performed by: FAMILY MEDICINE

## 2023-04-07 PROCEDURE — 80053 COMPREHEN METABOLIC PANEL: CPT | Performed by: FAMILY MEDICINE

## 2023-04-07 PROCEDURE — 85025 COMPLETE CBC W/AUTO DIFF WBC: CPT | Performed by: FAMILY MEDICINE

## 2023-04-07 PROCEDURE — 93010 ELECTROCARDIOGRAM REPORT: CPT | Performed by: INTERNAL MEDICINE

## 2023-04-07 NOTE — TELEPHONE ENCOUNTER
Called Pt back, and informed her that Dino Brownlee did reach out to [facility] at Dr. Arik Mcnally office on 04/04/23

## 2023-04-10 ENCOUNTER — TELEPHONE (OUTPATIENT)
Dept: SURGERY | Facility: CLINIC | Age: 63
End: 2023-04-10

## 2023-04-10 NOTE — TELEPHONE ENCOUNTER
Pt called and LVM regarding prophylactic antibiotic that was prescribed by Dr. Agatha Philip, and want to know if it was ok to take per [facility]. Called [facility] and she said yes it was ok and advised pt per Rachel's confirmation.

## 2023-04-12 ENCOUNTER — DOCUMENTATION ONLY (OUTPATIENT)
Dept: SURGERY | Facility: CLINIC | Age: 63
End: 2023-04-12

## 2023-04-12 ENCOUNTER — TELEPHONE (OUTPATIENT)
Dept: SURGERY | Facility: CLINIC | Age: 63
End: 2023-04-12

## 2023-04-12 DIAGNOSIS — C44.01 BASAL CELL CARCINOMA (BCC) OF SKIN OF LEFT UPPER LIP: Primary | ICD-10-CM

## 2023-04-12 NOTE — TELEPHONE ENCOUNTER
Patient LVM requesting a call back in regards to her surgery tomorrow. I called the patient back and she informed us that she decided not to go forward with her MOH's procedure today. She said that she wants Dr. Melody Rodriguez to do the excision and closure. Patient also stated that she wanted to not be under general anesthesia and after speaking with Dr. Melody Rodriguez she recommended patient have MAC anesthesia due to the sensitivity of the area. I also explained that she will still come to the hospital as previously planned nothing was going to change other than her type of anesthesia. Patient verbalized understanding and was appreciate of the call back.

## 2023-04-12 NOTE — PATIENT INSTRUCTIONS
Surgeon:              Dr. Lamar Sy. Asiya Jeffery, PhD                                          Tel:         [phone]                                  Fax:        [phone]    Surgery/Procedure: Excision of basal cell carcinoma left upper cutaneous lip, intraoperative frozen sections, reconstruction of the left upper lip with local tissue rearrangement, possible local flap, possible integra, possible skin graft, 2 hours, MAC anesthesia, outpatient   04/13/2023                                                             Hospital:  [facility]: [address] [place] [address], [place], Dorothy [facility]           [phone]  [facility]: [address], [facility]               [phone]    1. Someone will need to drive you to and from the hospital if your procedure is outpatient. 2.Do not drink alcohol or smoke 24 hours prior to your procedure. 3. Bring a picture ID and your insurance card. 4. You will be contacted by the hospital the day before to confirm the procedure time and location. 5. The hospital will also contact you approximately one week before surgery to schedule your COVID test.     6. Do not take any herbal supplements or blood thinners at least one week before your procedure/surgery. This includes NSAID's (aspirin, baby aspirin, Motrin, Ibuprofen, Aleve, Advil, Naproxen, etc), Plavix, fish oil, vitamin E, turmeric, CoQ10, or green tea supplements, etc. *TYLENOL or acetaminophen is ok to take*    7. Please inform us if you develop any Covid-19 like symptoms, test positive or have been exposed for Covid- 19 prior to surgery.

## 2023-04-13 ENCOUNTER — TELEPHONE (OUTPATIENT)
Dept: SURGERY | Facility: CLINIC | Age: 63
End: 2023-04-13

## 2023-04-13 NOTE — TELEPHONE ENCOUNTER
Pt called this morning to cancel her case due to anxiety and concerns of a large defect. I spoke to her after she had cancelled her surgery. We discussed her options and she can either reschedule excision with frozen sections and reconstruction under sedation or she can reschedule in the office re-excision without frozen sections. She will let us know.

## 2023-04-14 ENCOUNTER — TELEPHONE (OUTPATIENT)
Facility: CLINIC | Age: 63
End: 2023-04-14

## 2023-04-25 ENCOUNTER — TELEPHONE (OUTPATIENT)
Facility: CLINIC | Age: 63
End: 2023-04-25

## 2023-04-25 NOTE — TELEPHONE ENCOUNTER
The patient called and wanted the doctor to refer [place] [address] dermatologist or someone in the network of [facility] or [facility]. She also would like a referral for anxiety. She was given [id number] but he has no availability until next year.

## 2023-04-25 NOTE — TELEPHONE ENCOUNTER
Referrals generated. Please provide info for derm to patient and let her know S should call her regarding psychiatrists/psychologists for anxiety. Thank you.

## 2023-04-26 ENCOUNTER — TELEPHONE (OUTPATIENT)
Facility: CLINIC | Age: 63
End: 2023-04-26

## 2023-04-26 ENCOUNTER — OFFICE VISIT (OUTPATIENT)
Facility: CLINIC | Age: 63
End: 2023-04-26
Payer: COMMERCIAL

## 2023-04-26 VITALS
WEIGHT: 119 LBS | BODY MASS INDEX: 21.09 KG/M2 | OXYGEN SATURATION: 98 % | HEART RATE: 63 BPM | HEIGHT: 63 IN | DIASTOLIC BLOOD PRESSURE: 68 MMHG | SYSTOLIC BLOOD PRESSURE: 122 MMHG

## 2023-04-26 DIAGNOSIS — M79.18 CERVICAL MYOFASCIAL PAIN SYNDROME: Primary | ICD-10-CM

## 2023-04-26 PROCEDURE — 3078F DIAST BP <80 MM HG: CPT | Performed by: FAMILY MEDICINE

## 2023-04-26 PROCEDURE — 3074F SYST BP LT 130 MM HG: CPT | Performed by: FAMILY MEDICINE

## 2023-04-26 PROCEDURE — 3008F BODY MASS INDEX DOCD: CPT | Performed by: FAMILY MEDICINE

## 2023-04-26 PROCEDURE — 99214 OFFICE O/P EST MOD 30 MIN: CPT | Performed by: FAMILY MEDICINE

## 2023-04-26 RX ORDER — METHYLPREDNISOLONE 4 MG/1
TABLET ORAL
Qty: 1 EACH | Refills: 0 | Status: SHIPPED | OUTPATIENT
Start: 2023-04-26

## 2023-04-26 NOTE — TELEPHONE ENCOUNTER
Patient asking if Kwaku Michelle can fit her in the schedule today. Patient states she has a chronic myofascial pain syndrome. For past 4 days she states pain has increased in her neck and right shoulder. Patient having constant\" very sore\" muscle pain. She has tried extra strength Tylenol, helped a little. Patient takes pregabalin 75 mg daily. Patient states she has an upcoming appointment with physiatrist,Dr. Graham Arnold , but not until 5/9/23.

## 2023-04-26 NOTE — TELEPHONE ENCOUNTER
Please double book at 11:30 today and let pt know. Please inform her she may have to wait a bit since I am adding her on. Thank you.

## 2023-04-27 DIAGNOSIS — C44.01 BASAL CELL CARCINOMA (BCC) OF SKIN OF LEFT UPPER LIP: Primary | ICD-10-CM

## 2023-05-09 ENCOUNTER — HOSPITAL ENCOUNTER (OUTPATIENT)
Dept: GENERAL RADIOLOGY | Facility: HOSPITAL | Age: 63
Discharge: HOME | End: 2023-05-09
Attending: PHYSICAL MEDICINE & REHABILITATION
Payer: COMMERCIAL

## 2023-05-09 ENCOUNTER — OFFICE VISIT (OUTPATIENT)
Dept: PHYSICAL MEDICINE AND REHAB | Facility: CLINIC | Age: 63
End: 2023-05-09
Payer: COMMERCIAL

## 2023-05-09 VITALS
HEIGHT: 63 IN | WEIGHT: 119 LBS | HEART RATE: 65 BPM | OXYGEN SATURATION: 99 % | DIASTOLIC BLOOD PRESSURE: 60 MMHG | BODY MASS INDEX: 21.09 KG/M2 | SYSTOLIC BLOOD PRESSURE: 120 MMHG

## 2023-05-09 DIAGNOSIS — M54.2 TRIGGER POINT OF NECK: ICD-10-CM

## 2023-05-09 DIAGNOSIS — G89.29 CHRONIC NECK PAIN: ICD-10-CM

## 2023-05-09 DIAGNOSIS — M54.2 CHRONIC NECK PAIN: ICD-10-CM

## 2023-05-09 DIAGNOSIS — M54.2 TRIGGER POINT OF NECK: Primary | ICD-10-CM

## 2023-05-09 PROCEDURE — 3074F SYST BP LT 130 MM HG: CPT | Performed by: PHYSICAL MEDICINE & REHABILITATION

## 2023-05-09 PROCEDURE — 99214 OFFICE O/P EST MOD 30 MIN: CPT | Performed by: PHYSICAL MEDICINE & REHABILITATION

## 2023-05-09 PROCEDURE — 72050 X-RAY EXAM NECK SPINE 4/5VWS: CPT | Performed by: PHYSICAL MEDICINE & REHABILITATION

## 2023-05-09 PROCEDURE — 3078F DIAST BP <80 MM HG: CPT | Performed by: PHYSICAL MEDICINE & REHABILITATION

## 2023-05-09 PROCEDURE — 3008F BODY MASS INDEX DOCD: CPT | Performed by: PHYSICAL MEDICINE & REHABILITATION

## 2023-05-09 RX ORDER — MELOXICAM 15 MG/1
15 TABLET ORAL DAILY
Qty: 14 TABLET | Refills: 0 | Status: SHIPPED | OUTPATIENT
Start: 2023-05-09 | End: 2023-05-23

## 2023-05-09 NOTE — PATIENT INSTRUCTIONS
-Start physical therapy and home exercises  -Lyrica to be continued at nighttime  -Mobic for 2 weeks  -Ice/Heat as tolerated  -Xray on the way out today  -Please stop the medication if you have any side effects and call the office if you have any questions or concerns  -Follow up in 4 weeks

## 2023-05-17 ENCOUNTER — TELEPHONE (OUTPATIENT)
Facility: CLINIC | Age: 63
End: 2023-05-17

## 2023-05-17 NOTE — TELEPHONE ENCOUNTER
Patient calling (identified name and ) asking if it is ok for her to take a Vit B12 supplement of 1000 mcg/day. Patient states she went to a nutrition and aging lecture and they had recommended everyone over the age of 61 to take this supplement. Patient states she is a vegetarian and would like input from Dr. Kyle Lance. Please advise.

## 2023-05-19 ENCOUNTER — OFFICE VISIT (OUTPATIENT)
Dept: SURGERY | Facility: CLINIC | Age: 63
End: 2023-05-19
Payer: COMMERCIAL

## 2023-05-19 DIAGNOSIS — C44.01 BASAL CELL CARCINOMA (BCC) OF SKIN OF LEFT UPPER LIP: Primary | ICD-10-CM

## 2023-05-19 PROCEDURE — 88305 TISSUE EXAM BY PATHOLOGIST: CPT | Performed by: SURGERY

## 2023-05-19 PROCEDURE — 12051 INTMD RPR FACE/MM 2.5 CM/<: CPT | Performed by: SURGERY

## 2023-05-19 PROCEDURE — 11641 EXC F/E/E/N/L MAL+MRG 0.6-1: CPT | Performed by: SURGERY

## 2023-05-26 ENCOUNTER — OFFICE VISIT (OUTPATIENT)
Dept: SURGERY | Facility: CLINIC | Age: 63
End: 2023-05-26
Payer: COMMERCIAL

## 2023-05-26 DIAGNOSIS — C44.01 BASAL CELL CARCINOMA (BCC) OF SKIN OF LEFT UPPER LIP: Primary | ICD-10-CM

## 2023-05-26 PROCEDURE — 99024 POSTOP FOLLOW-UP VISIT: CPT | Performed by: PHYSICIAN ASSISTANT

## 2023-06-02 ENCOUNTER — TELEPHONE (OUTPATIENT)
Facility: CLINIC | Age: 63
End: 2023-06-02

## 2023-06-02 DIAGNOSIS — Z12.11 COLON CANCER SCREENING: Primary | ICD-10-CM

## 2023-06-02 NOTE — TELEPHONE ENCOUNTER
Pt states that she received a voicemail regarding location change for 6/28/23 CLN. She is worried that she will have to pay more out of pocket if changed to hospital.  She is requesting the insurance to be contacted to make sure that it is covered at the hospital.  She will also contact insurance. She is also requesting a call back.   Please call

## 2023-06-02 NOTE — TELEPHONE ENCOUNTER
Prior Auth department- please advise if change in location for 6/28/23 colonoscopy requires a PA. Thank you.

## 2023-06-06 ENCOUNTER — TELEPHONE (OUTPATIENT)
Dept: SURGERY | Facility: CLINIC | Age: 63
End: 2023-06-06

## 2023-06-06 NOTE — TELEPHONE ENCOUNTER
I spoke with [facility] and answered her questions regarding scar management. She noted minimal irritation surrounding the incision and will send a photo to our secure email. I recommended she avoid steri-strip use at this time as it may be contributing to the irritation.

## 2023-06-07 ENCOUNTER — TELEPHONE (OUTPATIENT)
Dept: SURGERY | Facility: CLINIC | Age: 63
End: 2023-06-07

## 2023-06-07 NOTE — TELEPHONE ENCOUNTER
Scheduled for:  Colonoscopy [id number]  Provider Name:  Dr Safia Porter  Date:  9/6/2023  Location:  [facility]  Sedation:  MAC  Time:  10:30 am. (pt is aware to arrive at 9:30 am)  Prep:  Split dose Suprep  Meds/Allergies Reconciled?:  Physician Reviewed  Diagnosis with codes:  CRC Screening Z12.11  Was patient informed to call insurance with codes (Y/N):  Yes   Referral sent?:  Referral was sent at the time of electronic surgical scheduling. [facility] or [address] notified?:  I sent an electronic request to Endo Scheduling and received a confirmation today. Medication Orders:  Pt is aware to NOT take iron pills, herbal meds and diet supplements for 7 days before exam. Also to NOT take any form of alcohol, recreational drugs and any forms of ED meds 24 hours before exam.   Misc Orders:  N/A   Further instructions given by staff:  I discussed the prep instructions with the patient which she verbally understood. I will send prep instructions via mail.

## 2023-06-07 NOTE — TELEPHONE ENCOUNTER
I called the patient to review her scar management questions. Questions were answered. She was appreciative of the call.

## 2023-06-16 ENCOUNTER — OFFICE VISIT (OUTPATIENT)
Dept: SURGERY | Facility: CLINIC | Age: 63
End: 2023-06-16
Payer: COMMERCIAL

## 2023-06-16 DIAGNOSIS — C44.01 BASAL CELL CARCINOMA (BCC) OF SKIN OF LEFT UPPER LIP: Primary | ICD-10-CM

## 2023-06-21 ENCOUNTER — TELEPHONE (OUTPATIENT)
Dept: SURGERY | Facility: CLINIC | Age: 63
End: 2023-06-21

## 2023-06-21 DIAGNOSIS — C44.01 BASAL CELL CARCINOMA (BCC) OF SKIN OF LEFT UPPER LIP: Primary | ICD-10-CM

## 2023-06-21 NOTE — TELEPHONE ENCOUNTER
Patient called to let us know she developed a small bump and some redness over her central incision. She denies any wound drainage. She sent a photo through our secure email. Photo was reviewed by myself and Dr. Rafa Muñoz. Patient will discontinue any topical scar management products including vitamin E oil and Silagen and will apply mupirocin ointment twice daily. She was instructed to call if this area gets worse or changes. She will send us an update in about a week. If this is not getting better, we will have her follow-up in the office. She expressed understanding and was appreciative of the call.

## 2023-06-21 NOTE — TELEPHONE ENCOUNTER
Pt called regarding scar site in middle section of site it has  swelling and she is concern would like to talk to BODØ.

## 2023-06-27 RX ORDER — PREGABALIN 75 MG/1
75 CAPSULE ORAL DAILY
Qty: 90 CAPSULE | Refills: 0 | Status: SHIPPED | OUTPATIENT
Start: 2023-06-27

## 2023-06-28 ENCOUNTER — TELEPHONE (OUTPATIENT)
Dept: SURGERY | Facility: CLINIC | Age: 63
End: 2023-06-28

## 2023-07-03 ENCOUNTER — TELEPHONE (OUTPATIENT)
Dept: SURGERY | Facility: CLINIC | Age: 63
End: 2023-07-03

## 2023-07-07 ENCOUNTER — OFFICE VISIT (OUTPATIENT)
Dept: SURGERY | Facility: CLINIC | Age: 63
End: 2023-07-07
Payer: COMMERCIAL

## 2023-07-07 DIAGNOSIS — C44.01 BASAL CELL CARCINOMA (BCC) OF SKIN OF LEFT UPPER LIP: Primary | ICD-10-CM

## 2023-07-07 NOTE — PROGRESS NOTES
Errol Silverio is a 57year old female who presents today for a follow-up after excision BCCa L upper cutaneous lip   She had some skin putule/ spitting vicryl suture and has used ointment and it improved and she is here for f/u. She denies fever and chills. She denies nausea, vomiting, diarrhea or constipation. Her pain is controlled. Physical Exam     Surgical incisions are clean dry and intact, no erythema, no wound drainage. There is a 1,, area of crusting on th center of the scar, possibly a resorbing suture    I cleaned the area with alcohol swaps and tried removing the crusting/ suture with a forceps but it appeared to be too deep in the scar, so the area was cleaned and a steristrips was applied for today. She also has a skin lesion on her L lower eyelid/ cheek junction that was pointed out by her dermatologist as suspicious for BCCa. There were no vitals filed for this visit. Assessment and Plan     Errol Silverio is doing well. I recommend continued scar massage and vaseline to the area as needed, if it appears to develop a pustule again she can use mupirocin ointment. I recommend Bx of the L lower eyelid/ cheek lesion and I am happy to do that anytime. She will schedule this when convenient for her. She reported a h/o spitting vicryl sutures in the past from another procedure so we will try to limit vicryl sutures in the future if possible. Questions were answered, patient understands. Josefa Rust MD  7/7/2023  11:11 AM

## 2023-07-25 ENCOUNTER — OFFICE VISIT (OUTPATIENT)
Facility: CLINIC | Age: 63
End: 2023-07-25
Payer: COMMERCIAL

## 2023-07-25 ENCOUNTER — NURSE TRIAGE (OUTPATIENT)
Facility: CLINIC | Age: 63
End: 2023-07-25

## 2023-07-25 VITALS — HEART RATE: 77 BPM | DIASTOLIC BLOOD PRESSURE: 64 MMHG | OXYGEN SATURATION: 98 % | SYSTOLIC BLOOD PRESSURE: 122 MMHG

## 2023-07-25 DIAGNOSIS — M25.562 ACUTE PAIN OF LEFT KNEE: ICD-10-CM

## 2023-07-25 DIAGNOSIS — M54.50 ACUTE BILATERAL LOW BACK PAIN WITHOUT SCIATICA: Primary | ICD-10-CM

## 2023-07-25 PROCEDURE — 3074F SYST BP LT 130 MM HG: CPT | Performed by: FAMILY MEDICINE

## 2023-07-25 PROCEDURE — 3078F DIAST BP <80 MM HG: CPT | Performed by: FAMILY MEDICINE

## 2023-07-25 PROCEDURE — 99214 OFFICE O/P EST MOD 30 MIN: CPT | Performed by: FAMILY MEDICINE

## 2023-07-25 NOTE — TELEPHONE ENCOUNTER
Yes, she can see Dr. Lomeli Dinwiddie. I am of course happy to see her, but she may see whomever she prefers. Thank you.

## 2023-07-25 NOTE — PROGRESS NOTES
HPI:    Patient ID: Tamsen Peabody is a 57year old female who presents for back & knee pain. HPI  Went back to running too quickly after taking some time off for one month. Started having left knee pain. No swelling. Was okay when running, but would have stiffening & pain afterwards. Last ran 1 week ago. Started having LBP 5 days ago. Waxing & weaning since then. Pain is b/l. Has chronic hx of LBP. Better with standing. Worse with sitting. No radiculopathy. No new numbness or tingling. No saddle paresthesias or changes in bowel/bladder habits. Has been many years since she had imaging of her low back or knee. Using tylenol 1000mg prn and ice. Has had similar flare in the past.     Past Medical History:   Diagnosis Date    Cancer ([facility] [address] 2016    basal call on upper lip, and again 2022 per pt    Cervical myofascial pain syndrome 05/02/2013    Chronic pain     right shoulder    COVID-19 05/2022    cough, fever, body aches    DDD (degenerative disc disease), cervical 08/29/2014    Hearing impairment     tinnitus    Osteoarthritis     OTHER DISEASES     shoulder pain    Visual impairment     contacts/glasses        Current Outpatient Medications   Medication Sig Dispense Refill    pregabalin 75 MG Oral Cap Take 1 capsule (75 mg total) by mouth daily. 90 capsule 0    mupirocin 2 % External Ointment Apply 1 Application topically 2 (two) times daily. 22 g 1    cholecalciferol 25 MCG (1000 UT) Oral Cap Take 1 capsule (1,000 Units total) by mouth daily. Multiple Vitamins-Minerals (CENTRUM) Oral Tab Take 1 tablet by mouth daily. Cipro Xr [Ciproflox*    RASH    Comment:Cipro XR    Review of Systems   See HPI. Otherwise negative. /64   Pulse 77   SpO2 98%     PHYSICAL EXAM:   Physical Exam  Constitutional:       General: She is not in acute distress. Appearance: Normal appearance. She is well-developed. She is not ill-appearing, toxic-appearing or diaphoretic. HENT:      Head: Normocephalic and atraumatic. Eyes:      Extraocular Movements: Extraocular movements intact. Conjunctiva/sclera: Conjunctivae normal.   Cardiovascular:      Rate and Rhythm: Normal rate and regular rhythm. Pulses: Normal pulses. Heart sounds: Normal heart sounds. No murmur heard. No friction rub. No gallop. Pulmonary:      Effort: Pulmonary effort is normal. No respiratory distress. Breath sounds: Normal breath sounds. No wheezing or rales. Musculoskeletal:      Lumbar back: No swelling, edema, deformity, spasms, tenderness or bony tenderness. Normal range of motion. Negative right straight leg raise test and negative left straight leg raise test.      Left knee: No swelling, deformity, effusion, erythema, ecchymosis, bony tenderness or crepitus. Normal range of motion. No tenderness. No LCL laxity, MCL laxity, ACL laxity or PCL laxity. Normal alignment, normal meniscus and normal patellar mobility. Normal pulse. Right lower leg: No edema. Left lower leg: No edema. Skin:     General: Skin is warm and dry. Capillary Refill: Capillary refill takes less than 2 seconds. Neurological:      General: No focal deficit present. Mental Status: She is alert. Psychiatric:         Mood and Affect: Mood normal.         Behavior: Behavior normal.         Thought Content: Thought content normal.         Judgment: Judgment normal.             ASSESSMENT/PLAN:   Acute bilateral low back pain without sciatica  (primary encounter diagnosis)  Acute pain of left knee    1. Acute bilateral low back pain without sciatica  -Acute on chronic LBP.  -Ddx includes OA versus lumbar muscle strain.  -Reviewed conservative management in detail including ice/heat & NSAIDs/tylenol/topicals prn.   -Discussed activity modification. -Given HEP. Focus on stretching of hip flexors & hamstrings as well.    -Defer imaging for now, however will plan to check baseline x-rays if fails to resolve. 2. Acute pain of left knee  -Likely 2/2 OA. -Conservative management as above. -Defer running for the next 1-2 weeks. If pain resolves, may very slowly re-introduce running and advance q1-2 weeks as tolerated. Meds This Visit:  Requested Prescriptions      No prescriptions requested or ordered in this encounter       Imaging & Referrals:  None    A total of 33 minutes were spent with patient and reviewing medical records pertaining to this visit.         Clara Pressley DO  #[id number]

## 2023-07-25 NOTE — TELEPHONE ENCOUNTER
Patient notified of provider's recommendation. Patient verbalized understanding. She will keep appt as scheduled. She will try to call DR Deisy Santoyo to see if he has any opening today. Will call back to cancel appt with DR Gupta if she changes her mind.

## 2023-07-25 NOTE — TELEPHONE ENCOUNTER
Action Requested: Summary for Provider     []  Critical Lab, Recommendations Needed  [x] Need Additional Advice  []   FYI    []   Need Orders  [] Need Medications Sent to Pharmacy  []  Other     SUMMARY: Dr. Farzana Amaya, patient was given appt today at 1:30pm. However she asked if she should see DR Schuyler Null instead? Reason for call: Low Back Pain  Onset: last week Thursday    Has been dealing with Lower back pain; \"both sides\", and has left knee pain. (Patient is a runner)    She states she started with left knee pain after running. Now worse pain in lower pain. Pain rate: 5/10 with tylenol ES    Back pain is aggravated by sitting. There was a couple of times that she has woken up in middle of her sleep due to pain. Knee pain has been going on for several weeks. Sometime stiffness. Patient asked if she should see DR Silvia Wayne instead? She wanted me to ask you before she comes for appt today.      Reason for Disposition   Patient wants to be seen    Protocols used: Back Pain-A-OH

## 2023-08-07 ENCOUNTER — NURSE TRIAGE (OUTPATIENT)
Facility: CLINIC | Age: 63
End: 2023-08-07

## 2023-08-07 NOTE — TELEPHONE ENCOUNTER
Recommend warm compresses. Will need in-person evaluation for additional recs given exam required. Limited availability this week as I'm covering for Dr. Nan Dumont so can use any SDA available or can trial IC. Thank you.

## 2023-08-07 NOTE — TELEPHONE ENCOUNTER
Action Requested: Summary for Provider     []  Critical Lab, Recommendations Needed  [] Need Additional Advice  []   FYI    []   Need Orders  [] Need Medications Sent to Pharmacy  []  Other     SUMMARY:    Please advise. An appointment was offered and she asked if we can give any advise over the phone     The patient stated she pulled a hair from her chin and now the area has a dime size bump 1/10 pain, hard with swelling, redness. Denies a fever, difficulty breathing/swallowing. Does not think it feels warm. She has been applying ice and heat to the area. I stated to hold off for now. Reason for call: Bump  Onset: Data Unavailable      General Assessment (questions to ask the caller)  Do you consider this a medical emergency?: No  Can you access 911?: Yes  Do you have any pain?: Yes  What is the severity of your pain?  (Scale 1-10): 1  Do you have a fever?: No  What is the date of your last medical exam?: 07/25/23  Additional Assessments  Are these symptoms new, recurrent, or chronic?: new (started Saturday 8/5/23)                Reason for Disposition   Patient wants to be seen    Protocols used: Skin Lump or Localized Swelling-A-OH

## 2023-08-07 NOTE — TELEPHONE ENCOUNTER
Advised patient of Dr. Elham Lay  recommendation. Patient verbalized understanding and had no further questions. She will do the warm compresses and if need be will either call back tomorrow or go to IC.

## 2023-08-28 ENCOUNTER — TELEPHONE (OUTPATIENT)
Facility: CLINIC | Age: 63
End: 2023-08-28

## 2023-08-28 DIAGNOSIS — M25.562 CHRONIC PAIN OF LEFT KNEE: Primary | ICD-10-CM

## 2023-08-28 DIAGNOSIS — G89.29 CHRONIC PAIN OF LEFT KNEE: Primary | ICD-10-CM

## 2023-08-30 ENCOUNTER — TELEPHONE (OUTPATIENT)
Facility: CLINIC | Age: 63
End: 2023-08-30

## 2023-08-31 ENCOUNTER — TELEPHONE (OUTPATIENT)
Facility: CLINIC | Age: 63
End: 2023-08-31

## 2023-08-31 DIAGNOSIS — Z12.11 SCREEN FOR COLON CANCER: Primary | ICD-10-CM

## 2023-09-01 ENCOUNTER — TELEPHONE (OUTPATIENT)
Dept: SURGERY | Facility: CLINIC | Age: 63
End: 2023-09-01

## 2023-09-25 RX ORDER — PREGABALIN 75 MG/1
75 CAPSULE ORAL DAILY
Qty: 90 CAPSULE | Refills: 0 | Status: SHIPPED | OUTPATIENT
Start: 2023-09-25

## 2023-09-25 NOTE — TELEPHONE ENCOUNTER
RN called patient. Patient's date of birth and full name both confirmed. Informed her that prescription was sent. RN could not send a MyChart message and patient was out of medication. She verbalizes understanding of all information, and agreeable to plan.

## 2023-09-25 NOTE — TELEPHONE ENCOUNTER
Verified name and . Patient calling to request Pregabalin refill. She states she is out of the medication now and is requesting that it be filled today.     Medication pended for your review and approval.

## 2023-09-29 ENCOUNTER — HOSPITAL ENCOUNTER (OUTPATIENT)
Dept: GENERAL RADIOLOGY | Age: 63
Discharge: HOME | End: 2023-09-29
Attending: FAMILY MEDICINE
Payer: COMMERCIAL

## 2023-09-29 DIAGNOSIS — G89.29 CHRONIC PAIN OF LEFT KNEE: ICD-10-CM

## 2023-09-29 DIAGNOSIS — M25.562 CHRONIC PAIN OF LEFT KNEE: ICD-10-CM

## 2023-09-29 PROCEDURE — 73562 X-RAY EXAM OF KNEE 3: CPT | Performed by: FAMILY MEDICINE

## 2023-10-01 DIAGNOSIS — M25.562 CHRONIC PAIN OF LEFT KNEE: Primary | ICD-10-CM

## 2023-10-01 DIAGNOSIS — G89.29 CHRONIC PAIN OF LEFT KNEE: Primary | ICD-10-CM

## 2023-11-28 ENCOUNTER — TELEPHONE (OUTPATIENT)
Facility: CLINIC | Age: 63
End: 2023-11-28

## 2023-11-28 DIAGNOSIS — Z12.11 SCREEN FOR COLON CANCER: Primary | ICD-10-CM

## 2023-11-29 NOTE — TELEPHONE ENCOUNTER
Dr. Jacqueline Gómez, please advise-- patient requesting PCP's recommendations regarding RSV vaccine. Thank you. Contacted patient (name and  of patient verified). She reports her wellness visit is not until January. She is unsure if she should get:   Covid Booster  Flu Shot  Shingles Vaccine   Tetanus Shot  RSV Vaccine  Per Centers for Disease Control and Prevention's (CDC's) guidelines, patient advised to get all vaccines except for RSV vaccine as CDC recommends discussing with PCP. Patient verbalizes understanding. She states she may wait to get shingles vaccine until December when Medicare kicks in.

## 2023-11-29 NOTE — TELEPHONE ENCOUNTER
We will discuss all vaccines at her visit, but recommend flu & covid vaccines in the meantime. Thank you.

## 2023-11-30 NOTE — TELEPHONE ENCOUNTER
Spoke with the patient,verified full name and     Informed her of message no further questions    Future Appointments   Date Time Provider Casper Young   12/15/2023 10:00 AM Fela Wilson., MD [facility] S[facility]   2023  2:15 PM TERRELL, PROCEDURE [facility] None   2024  1:00 PM Pito Weeks DO [facility] 14 FP E[facility] 10 OP

## 2023-12-11 NOTE — TELEPHONE ENCOUNTER
Dr. Carr Sebastian-   Patient has been rescheduled to 3/27/24. Can you please send a new suprep to the [facility] as the previous one will be . Thank you!

## 2023-12-11 NOTE — TELEPHONE ENCOUNTER
Rescheduled for:  Colonoscopy [id number]  Provider Name:  Dr Coni Weston  Date:  FROM  12/20/23 TO 3/27/2024   Location:  [facility]  Sedation:  MAC  Time:  FROM  2:15 PM TO 11:15  (pt is aware to arrive at 10:15 PM)  Prep:  Split dose Suprep  Meds/Allergies Reconciled?:  Physician Reviewed  Diagnosis with codes:  CRC Screening Z12.11  Was patient informed to call insurance with codes (Y/N):  Yes   Referral sent?:  Referral was sent at the time of electronic surgical scheduling. [address] [place] [address] or [facility] notified?:  I sent an electronic request to Endo Scheduling and received a confirmation today. Medication Orders:  Pt is aware to NOT take iron pills, herbal meds and diet supplements for 7 days before exam. Also to NOT take any form of alcohol, recreational drugs and any forms of ED meds 24 hours before exam.   Misc Orders:  N/A   Further instructions given by staff:  I discussed the prep instructions with the patient which she verbally understood. I will send prep instructions via mail.

## 2023-12-12 RX ORDER — SODIUM, POTASSIUM,MAG SULFATES 17.5-3.13G
SOLUTION, RECONSTITUTED, ORAL ORAL
Qty: 1 EACH | Refills: 0 | Status: SHIPPED | OUTPATIENT
Start: 2023-12-12

## 2023-12-15 ENCOUNTER — TELEPHONE (OUTPATIENT)
Facility: CLINIC | Age: 63
End: 2023-12-15

## 2023-12-15 NOTE — TELEPHONE ENCOUNTER
Dr. Pacheco Escoto:  patient has 2 concerns. 1) Patient called about pregabalin 75mg. She is due  for next refill 12/25/23. However she wishes to decrease to 50mg. She asked if you can send new RX for #30. She wants to wean off. She said she tried to do this in the past, but was not successful. 2)Patient also c/o \"sacral\" pain, left side; another \"flare up\" aggravated by running; Some days struggle to get in/out of car.   taking advil; has been improving. She had this problem in the past and you provided exercises (which she is not doing). LOV 7/25/23  She wants to know what advice you recommend. I advised f/u appt. She states she has appt Appt 1/23/24 for wellness;   She would like your feedback first.

## 2023-12-16 RX ORDER — PREGABALIN 50 MG/1
50 CAPSULE ORAL DAILY
Qty: 30 CAPSULE | Refills: 0 | Status: SHIPPED | OUTPATIENT
Start: 2023-12-24

## 2023-12-16 NOTE — TELEPHONE ENCOUNTER
1) Rx sent for gabapentin 50mg capsules. 2) I recommend either completing the home stretches/exercises daily or a trial of formal PT. If she prefers PT, okay to generate referral. Thank you.

## 2023-12-16 NOTE — TELEPHONE ENCOUNTER
Patient was advised of 's notes and verbalized understanding. Patient declining PT at this time. Patient scheduled for a sooner appointment.     Future Appointments   Date Time Provider Casper Young   12/19/2023  1:30 PM Usman Lopez MD E[facility] 14 FP [facility] 10 OP   1/23/2024  1:00 PM David Rodas DO E[facility] 14 FP [facility] 10 OP   3/27/2024 11:15 AM TERRELL, PROCEDURE [facility] None

## 2023-12-19 ENCOUNTER — OFFICE VISIT (OUTPATIENT)
Facility: CLINIC | Age: 63
End: 2023-12-19
Payer: MEDICARE

## 2023-12-19 VITALS — HEART RATE: 70 BPM | OXYGEN SATURATION: 100 % | SYSTOLIC BLOOD PRESSURE: 116 MMHG | DIASTOLIC BLOOD PRESSURE: 60 MMHG

## 2023-12-19 DIAGNOSIS — G89.29 CHRONIC BILATERAL LOW BACK PAIN WITHOUT SCIATICA: ICD-10-CM

## 2023-12-19 DIAGNOSIS — M79.10 TRIGGER POINT: ICD-10-CM

## 2023-12-19 DIAGNOSIS — M79.18 CERVICAL MYOFASCIAL PAIN SYNDROME: Primary | ICD-10-CM

## 2023-12-19 DIAGNOSIS — M50.30 DDD (DEGENERATIVE DISC DISEASE), CERVICAL: ICD-10-CM

## 2023-12-19 DIAGNOSIS — G89.29 CHRONIC PAIN OF LEFT KNEE: ICD-10-CM

## 2023-12-19 DIAGNOSIS — M54.50 CHRONIC BILATERAL LOW BACK PAIN WITHOUT SCIATICA: ICD-10-CM

## 2023-12-19 DIAGNOSIS — M25.562 CHRONIC PAIN OF LEFT KNEE: ICD-10-CM

## 2023-12-19 PROCEDURE — 99213 OFFICE O/P EST LOW 20 MIN: CPT | Performed by: FAMILY MEDICINE

## 2023-12-19 RX ORDER — PREGABALIN 75 MG/1
75 CAPSULE ORAL DAILY
Qty: 30 CAPSULE | Refills: 0 | Status: SHIPPED | OUTPATIENT
Start: 2023-12-19

## 2024-01-22 DIAGNOSIS — M54.50 CHRONIC BILATERAL LOW BACK PAIN WITHOUT SCIATICA: ICD-10-CM

## 2024-01-22 DIAGNOSIS — G89.29 CHRONIC BILATERAL LOW BACK PAIN WITHOUT SCIATICA: ICD-10-CM

## 2024-01-22 DIAGNOSIS — M79.18 CERVICAL MYOFASCIAL PAIN SYNDROME: ICD-10-CM

## 2024-01-22 DIAGNOSIS — M50.30 DDD (DEGENERATIVE DISC DISEASE), CERVICAL: ICD-10-CM

## 2024-01-22 RX ORDER — PREGABALIN 75 MG/1
75 CAPSULE ORAL DAILY
Qty: 90 CAPSULE | Refills: 0 | Status: SHIPPED | OUTPATIENT
Start: 2024-01-22

## 2024-01-22 NOTE — TELEPHONE ENCOUNTER
Spoke to patient. She is requesting a 90 day supply of Pregabalin. She only has one pill left and does not want to wait until she sees Dr. Gupta tomorrow. She was going to try and decrease medication but decided against it for now.     Dr. Sprague, for Dr. Gupta- please advise on refill.

## 2024-01-22 NOTE — TELEPHONE ENCOUNTER
Noted [place] go [place] and [place]ill[place] 01[place]22/24 . No red flags. Refilled Lyrica as written.       Kristine Sprague MD, 01/22/24, 12:30 PM

## 2024-01-23 ENCOUNTER — LAB ENCOUNTER (OUTPATIENT)
Dept: LAB | Facility: REFERENCE LAB | Age: 64
End: 2024-01-23
Attending: FAMILY MEDICINE
Payer: MEDICARE

## 2024-01-23 ENCOUNTER — OFFICE VISIT (OUTPATIENT)
Facility: CLINIC | Age: 64
End: 2024-01-23
Payer: MEDICARE

## 2024-01-23 VITALS — SYSTOLIC BLOOD PRESSURE: 122 MMHG | DIASTOLIC BLOOD PRESSURE: 50 MMHG | HEART RATE: 71 BPM | OXYGEN SATURATION: 98 %

## 2024-01-23 DIAGNOSIS — L65.9 HAIR LOSS: ICD-10-CM

## 2024-01-23 DIAGNOSIS — Z12.31 ENCOUNTER FOR SCREENING MAMMOGRAM FOR MALIGNANT NEOPLASM OF BREAST: ICD-10-CM

## 2024-01-23 DIAGNOSIS — M79.18 CERVICAL MYOFASCIAL PAIN SYNDROME: ICD-10-CM

## 2024-01-23 DIAGNOSIS — F41.9 ANXIETY: ICD-10-CM

## 2024-01-23 DIAGNOSIS — Z78.0 POSTMENOPAUSAL: ICD-10-CM

## 2024-01-23 DIAGNOSIS — Z00.00 ENCOUNTER FOR ANNUAL HEALTH EXAMINATION: Primary | ICD-10-CM

## 2024-01-23 DIAGNOSIS — Z00.00 ENCOUNTER FOR ANNUAL HEALTH EXAMINATION: ICD-10-CM

## 2024-01-23 DIAGNOSIS — Z80.0 FAMILY HISTORY OF COLON CANCER IN FATHER: ICD-10-CM

## 2024-01-23 LAB
ALBUMIN SERPL-MCNC: 4.5 G/DL (ref 3.2–4.8)
ALBUMIN/GLOB SERPL: 1.7 {RATIO} (ref 1–2)
ALP LIVER SERPL-CCNC: 89 U/L
ALT SERPL-CCNC: 15 U/L
ANION GAP SERPL CALC-SCNC: 7 MMOL/L (ref 0–18)
AST SERPL-CCNC: 27 U/L (ref ?–34)
BASOPHILS # BLD AUTO: 0.05 X10(3) UL (ref 0–0.2)
BASOPHILS NFR BLD AUTO: 0.9 %
BILIRUB SERPL-MCNC: 0.4 MG/DL (ref 0.2–1.1)
BUN BLD-MCNC: 13 MG/DL (ref 9–23)
BUN/CREAT SERPL: 17.1 (ref 10–20)
CALCIUM BLD-MCNC: 9.7 MG/DL (ref 8.7–10.4)
CHLORIDE SERPL-SCNC: 107 MMOL/L (ref 98–112)
CHOLEST SERPL-MCNC: 175 MG/DL (ref ?–200)
CO2 SERPL-SCNC: 28 MMOL/L (ref 21–32)
CREAT BLD-MCNC: 0.76 MG/DL
DEPRECATED RDW RBC AUTO: 38.6 FL (ref 35.1–46.3)
EGFRCR SERPLBLD CKD-EPI 2021: 87 ML/MIN/1.73M2 (ref 60–?)
EOSINOPHIL # BLD AUTO: 0.1 X10(3) UL (ref 0–0.7)
EOSINOPHIL NFR BLD AUTO: 1.9 %
ERYTHROCYTE [DISTWIDTH] IN BLOOD BY AUTOMATED COUNT: 11.9 % (ref 11–15)
EST. AVERAGE GLUCOSE BLD GHB EST-MCNC: 120 MG/DL (ref 68–126)
FASTING PATIENT LIPID ANSWER: NO
FASTING STATUS PATIENT QL REPORTED: NO
GLOBULIN PLAS-MCNC: 2.7 G/DL (ref 2.8–4.4)
GLUCOSE BLD-MCNC: 88 MG/DL (ref 70–99)
HBA1C MFR BLD: 5.8 % (ref ?–5.7)
HCT VFR BLD AUTO: 35.6 %
HDLC SERPL-MCNC: 61 MG/DL (ref 40–59)
HGB BLD-MCNC: 11.8 G/DL
IMM GRANULOCYTES # BLD AUTO: 0 X10(3) UL (ref 0–1)
IMM GRANULOCYTES NFR BLD: 0 %
LDLC SERPL CALC-MCNC: 101 MG/DL (ref ?–100)
LYMPHOCYTES # BLD AUTO: 2.1 X10(3) UL (ref 1–4)
LYMPHOCYTES NFR BLD AUTO: 39.5 %
MCH RBC QN AUTO: 29.4 PG (ref 26–34)
MCHC RBC AUTO-ENTMCNC: 33.1 G/DL (ref 31–37)
MCV RBC AUTO: 88.6 FL
MONOCYTES # BLD AUTO: 0.41 X10(3) UL (ref 0.1–1)
MONOCYTES NFR BLD AUTO: 7.7 %
NEUTROPHILS # BLD AUTO: 2.66 X10 (3) UL (ref 1.5–7.7)
NEUTROPHILS # BLD AUTO: 2.66 X10(3) UL (ref 1.5–7.7)
NEUTROPHILS NFR BLD AUTO: 50 %
NONHDLC SERPL-MCNC: 114 MG/DL (ref ?–130)
OSMOLALITY SERPL CALC.SUM OF ELEC: 294 MOSM/KG (ref 275–295)
PLATELET # BLD AUTO: 250 10(3)UL (ref 150–450)
POTASSIUM SERPL-SCNC: 3.7 MMOL/L (ref 3.5–5.1)
PROT SERPL-MCNC: 7.2 G/DL (ref 5.7–8.2)
RBC # BLD AUTO: 4.02 X10(6)UL
SODIUM SERPL-SCNC: 142 MMOL/L (ref 136–145)
TRIGL SERPL-MCNC: 69 MG/DL (ref 30–149)
TSI SER-ACNC: 2.87 MIU/ML (ref 0.55–4.78)
VIT D+METAB SERPL-MCNC: 36.6 NG/ML (ref 30–100)
VLDLC SERPL CALC-MCNC: 11 MG/DL (ref 0–30)
WBC # BLD AUTO: 5.3 X10(3) UL (ref 4–11)

## 2024-01-23 PROCEDURE — 36415 COLL VENOUS BLD VENIPUNCTURE: CPT

## 2024-01-23 PROCEDURE — G0438 PPPS, INITIAL VISIT: HCPCS | Performed by: FAMILY MEDICINE

## 2024-01-23 PROCEDURE — 84443 ASSAY THYROID STIM HORMONE: CPT

## 2024-01-23 PROCEDURE — 85025 COMPLETE CBC W/AUTO DIFF WBC: CPT

## 2024-01-23 PROCEDURE — 83036 HEMOGLOBIN GLYCOSYLATED A1C: CPT

## 2024-01-23 PROCEDURE — 80053 COMPREHEN METABOLIC PANEL: CPT

## 2024-01-23 PROCEDURE — 80061 LIPID PANEL: CPT

## 2024-01-23 PROCEDURE — 82306 VITAMIN D 25 HYDROXY: CPT

## 2024-01-23 PROCEDURE — 99214 OFFICE O/P EST MOD 30 MIN: CPT | Performed by: FAMILY MEDICINE

## 2024-01-23 NOTE — PATIENT INSTRUCTIONS
Winnie Cruz's SCREENING SCHEDULE   Tests on this list are recommended by your physician but may not be covered, or covered at this frequency, by your insurer.   Please check with your insurance carrier before scheduling to verify coverage.   PREVENTATIVE SERVICES FREQUENCY &  COVERAGE DETAILS LAST COMPLETION DATE   Diabetes Screening    Fasting Blood Sugar /  Glucose    One screening every 12 months if never tested or if previously tested but not diagnosed with pre-diabetes   One screening every 6 months if diagnosed with pre-diabetes Lab Results   Component Value Date     (H) 04/07/2023        Cardiovascular Disease Screening    Lipid Panel  Cholesterol  Lipoprotein (HDL)  Triglycerides Covered every 5 years for all Medicare beneficiaries without apparent signs or symptoms of cardiovascular disease Lab Results   Component Value Date    CHOLEST 181 12/15/2022    HDL 65 (H) 12/15/2022     (H) 12/15/2022    TRIG 57 12/15/2022         Electrocardiogram (EKG)   Covered if needed at Welcome to Medicare, and non-screening if indicated for medical reasons 04/07/2023      Ultrasound Screening for Abdominal Aortic Aneurysm (AAA) Covered once in a lifetime for one of the following risk factors   • Men who are 65-75 years old and have ever smoked   • Anyone with a family history -     Colorectal Cancer Screening  Covered for ages 50-85; only need ONE of the following:    Colonoscopy   Covered every 10 years    Covered every 2 years if patient is at high risk or previous colonoscopy was abnormal -    Health Maintenance   Topic Date Due   • Colorectal Cancer Screening  07/25/2016       Flexible Sigmoidoscopy   Covered every 4 years -    Fecal Occult Blood Test Covered annually -   Bone Density Screening    Bone density screening    Covered every 2 years after age 65 if diagnosed with risk of osteoporosis or estrogen deficiency.    Covered yearly for long-term glucocorticoid medication use (Steroids) No results  found for this or any previous visit.      Health Maintenance Due   Topic Date Due   • DEXA Scan  Never done      Pap and Pelvic    Pap   Covered every 2 years for women at normal risk; Annually if at high risk 04/18/2019  Health Maintenance   Topic Date Due   • Pap Smear  Never done       Chlamydia Annually if high risk -  No recommendations at this time   Screening Mammogram    Mammogram     Recommend annually for all female patients aged 40 and older    One baseline mammogram covered for patients aged 35-39 02/04/2023    Health Maintenance   Topic Date Due   • Mammogram  02/04/2024       Immunizations    Influenza Covered once per flu season  Please get every year -  No recommendations at this time    Pneumococcal Each vaccine ([id number] & [id number]) covered once after 65 Prevnar 13: -    [id number]: -     No recommendations at this time    Hepatitis B One screening covered for patients with certain risk factors   -  No recommendations at this time    Tetanus Toxoid Not covered by Medicare Part B unless medically necessary (cut with metal); may be covered with your pharmacy prescription benefits -    Tetanus, Diptheria and Pertusis TD and TDaP Not covered by Medicare Part B -  No recommendations at this time    Zoster Not covered by Medicare Part B; may be covered with your pharmacy  prescription benefits -  Zoster Vaccines(1 of 2) Never done     Annual Monitoring of Persistent Medications (ACE/ARB, digoxin diuretics, anticonvulsants)    Potassium Annually Lab Results   Component Value Date    K 3.9 04/07/2023         Creatinine   Annually Lab Results   Component Value Date    CREATSERUM 0.79 04/07/2023         BUN Annually Lab Results   Component Value Date    BUN 13 04/07/2023       Drug Serum Conc Annually No results found for: \"DIGOXIN\", \"DIG\", \"VALP\"

## 2024-01-23 NOTE — PROGRESS NOTES
Subjective:   Winnie Cruz is a 63 year old female who presents for a Medicare Initial Preventative Physical Exam (Welcome to Medicare- < 12 months on Medicare) and scheduled follow up of multiple significant but stable problems.     Recently started PT for LBP and left knee.   Still has chronic neck pains. Would like to wean pregabalin but not at this time. Still taking pregabalin 75mg daily.     Still has anxiety and grief. Never filled rx for prozac in the past. Anxious about starting medication. Still questioning it.     Feels like she's losing lots of hair. Wondering about taking biotin supplement.     Diet isn't the greatest. Eats a good breakfast. Has something sweet in afternoon. Dinner is usually a frozen dinner or soup.     Last pap: negative cytology in 04/2019   Last mammogram: 02/2023 and normal   Previous colonoscopy: Has scheduled with Dr. Mejia in 03/2024  Family hx of breast, ovarian, cervical or colon CA: See FH  Diet and exercise: No regular exercise. Vegetarian diet.   Immunizations:  Tdap: >10 years ago, Flu: to consider, PCV20: to consider, Shingles: to consider, Covid: to consider new booster    History/Other:   Fall Risk Assessment:   She has been screened for Falls and is low risk.      Cognitive Assessment:   She had a completely normal cognitive assessment - see flowsheet entries       Functional Ability/Status:   Winnie Cruz has some abnormal functions as listed below:  She has Hearing problems based on screening of functional status.She has Vision problems based on screening of functional status.       Depression Screening (PHQ-2/PHQ-9): PHQ-2 SCORE: 0  , done 1/23/2024             Advanced Directives:   She does NOT have a Living Will. [Do you have a living will?: No]  She does NOT have a Power of  for Health Care. [Do you have a healthcare power of ?: No]  Not discussed      Patient Active Problem List   Diagnosis    Cervical myofascial pain syndrome    DDD  (degenerative disc disease), cervical    Family history of colon cancer in father    Vegetarian    Tinnitus of left ear    Trigger ring finger of right hand    Carpal tunnel syndrome of right wrist    Stress incontinence    Chronic bilateral low back pain without sciatica    Trigger finger of all digits of right hand    Basal cell carcinoma (BCC) of skin of left upper lip     Allergies:  She is allergic to cipro xr [ciprofloxacin-cipro hcl- cipro betaine].    Current Medications:  Outpatient Medications Marked as Taking for the 1/23/24 encounter (Office Visit) with Imtiaz Gupta, DO   Medication Sig    PREGABALIN 75 MG Oral Cap TAKE ONE CAPSULE BY MOUTH ONE TIME DAILY    cholecalciferol 25 MCG (1000 UT) Oral Cap Take 1 capsule (1,000 Units total) by mouth daily.    Multiple Vitamins-Minerals (CENTRUM) Oral Tab Take 1 tablet by mouth daily.       Medical History:  She  has a past medical history of Cancer ([facility]) (2016), Cervical myofascial pain syndrome (05/02/2013), Chronic pain, COVID-19 (05/2022), DDD (degenerative disc disease), cervical (08/29/2014), Hearing impairment, Osteoarthritis, OTHER DISEASES, and Visual impairment.  Surgical History:  She  has a past surgical history that includes colonoscopy,diagnostic (07/25/2011); oral surgery (1984); lipoma removal (Left, 2012); and biopsy of skin lesion (2012).   Family History:  Her family history includes Cancer in her maternal grandmother; Cancer (age of onset: 67) in her father; IBS in her sister; Osteoporosis in her sister; Pacemaker in her father; Prostate Cancer in her father; Stroke in her brother and father; alzheimers in her mother; aphasia in her brother.  Social History:  She  reports that she has never smoked. She has never used smokeless tobacco. She reports that she does not drink alcohol and does not use drugs.    Tobacco:  She has never smoked tobacco.    CAGE Alcohol Screen:   CAGE screening score of 0 on 1/23/2024, showing low risk of alcohol  abuse.      Patient Care Team:  Imtiaz Gupta DO as PCP - General (Family Medicine)    Review of Systems  GENERAL: see HPI. feels well otherwise  SKIN: denies any unusual skin lesions  EYES: denies blurred vision or double vision  HEENT: denies nasal congestion, sinus pain or ST  LUNGS: denies shortness of breath with exertion  CARDIOVASCULAR: denies chest pain on exertion  GI: denies abdominal pain, denies heartburn  : denies dysuria, vaginal discharge or itching, no complaint of urinary incontinence   MUSCULOSKELETAL: see HPI  NEURO: denies headaches  PSYCHE: +anxiety  HEMATOLOGIC: denies hx of anemia  ENDOCRINE: denies thyroid history  ALL/ASTHMA: negative    Objective:   Physical Exam  General Appearance:  Alert, cooperative, no distress, appears stated age   Head:  Normocephalic, without obvious abnormality, atraumatic   Eyes:  PERRL, conjunctiva/corneas clear, EOM's intact both eyes   Ears:  Normal TM's and external ear canals, both ears   Nose: Nares normal, septum midline,mucosa normal, no drainage or sinus tenderness   Throat: Lips, mucosa, and tongue normal; teeth and gums normal   Neck: Supple, symmetrical, trachea midline, no adenopathy;  thyroid: not enlarged, symmetric, no tenderness/mass/nodules; no carotid bruit or JVD   Back:   Symmetric, no curvature   Lungs:   Clear to auscultation bilaterally, respirations unlabored   Heart:  Regular rate and rhythm, S1 and S2 normal, no murmur, rub, or gallop   Abdomen:   Soft, non-tender, bowel sounds active all four quadrants,  no masses, no organomegaly   Pelvic: Deferred   Extremities: Extremities normal, atraumatic, no cyanosis or edema   Pulses: 2+ and symmetric   Skin: Skin color, texture, turgor normal, no rashes or lesions   Lymph nodes: Cervical, supraclavicular, and axillary nodes normal   Neurologic: Normal       /50   Pulse 71   SpO2 98%  Estimated body mass index is 21.08 kg/m² as calculated from the following:    Height as of 5/9/23:  5' 3\" (1.6 m).    Weight as of 5/9/23: 119 lb (54 kg).    Medicare Hearing Assessment:   Hearing Screening    Screening Method: Finger Rub  Finger Rub Result: Pass               Visual Acuity:   Right Eye Visual Acuity: Corrected Right Eye Chart Acuity: 20/40   Left Eye Visual Acuity: Corrected Left Eye Chart Acuity: 20/30   Both Eyes Visual Acuity: Corrected Both Eyes Chart Acuity: 20/30   Able To Tolerate Visual Acuity: Yes        Assessment & Plan:   Winnie Cruz is a 63 year old female who presents for a Medicare Assessment.     1. Encounter for annual health examination (Primary)  -     Hemoglobin A1C; Future; Expected date: 01/23/2024  -     CBC With Differential With Platelet; Future; Expected date: 01/23/2024  -     Comp Metabolic Panel (14); Future; Expected date: 01/23/2024  -     Lipid Panel; Future; Expected date: 01/23/2024  -     TSH W Reflex To Free T4; Future; Expected date: 01/23/2024  -     Vitamin D; Future; Expected date: 01/23/2024  2. Encounter for screening mammogram for malignant neoplasm of breast  -     [facility] ERICK 2D+3D SCREENING BILAT (CPT=77067/71868); Future; Expected date: 01/23/2024  3. Hair loss  -     CBC With Differential With Platelet; Future; Expected date: 01/23/2024  -     TSH W Reflex To Free T4; Future; Expected date: 01/23/2024  -     Vitamin D; Future; Expected date: 01/23/2024  4. Anxiety  5. Postmenopausal  -     XR DEXA BONE DENSITOMETRY (CPT=77080); Future; Expected date: 01/23/2024  6. Family history of colon cancer in father  7. Cervical myofascial pain syndrome    -Cervical myofascial pain syndrome: Will continue pregabalin 75mg daily for now. Pt to update if she'd like to wean dose, and will plan to wean to 50mg daily x3 months while monitoring for worsening of pain.   -Anxiety: Again recommend trial of prozac. Patient to consider.   -Hair loss: Possibly related to anxiety/stress. Check labs.   -FH of CRC: Has colonoscopy scheduled in 03/2024.  -Annual labs ordered.    -DEXA ordered.   -Due for mammo in 02/2024.  -To consider vaccines per HPI.     The patient indicates understanding of these issues and agrees to the plan.  Imaging studies ordered.  Lab work ordered.  Reinforced healthy diet, lifestyle, and exercise.      No follow-ups on file.     Imtiaz Gupta DO, 1/23/2024     Supplementary Documentation:   General Health:  In the past six months, have you lost more than 10 pounds without trying?: 2 - No  Has your appetite been poor?: Yes  Type of Diet: Vegetarian  How does the patient maintain a good energy level?: Appropriate Exercise;Other  How would you describe your daily physical activity?: Moderate  How would you describe your current health state?: Good  How do you maintain positive mental well-being?: Social Interaction;Visiting Friends;Visiting Family  On a scale of 0 to 10, with 0 being no pain and 10 being severe pain, what is your pain level?: 2 - (Mild)  In the past six months, have you experienced urine leakage?: 1-Yes  At any time do you feel concerned for the safety/well-being of yourself and/or your children, in your home or elsewhere?: Yes  Have you had any immunizations at another office such as Influenza, Hepatitis B, Tetanus, or Pneumococcal?: No         Winnie Cruz's SCREENING SCHEDULE   Tests on this list are recommended by your physician but may not be covered, or covered at this frequency, by your insurer.   Please check with your insurance carrier before scheduling to verify coverage.   PREVENTATIVE SERVICES FREQUENCY &  COVERAGE DETAILS LAST COMPLETION DATE   Diabetes Screening    Fasting Blood Sugar /  Glucose    One screening every 12 months if never tested or if previously tested but not diagnosed with pre-diabetes   One screening every 6 months if diagnosed with pre-diabetes Lab Results   Component Value Date     (H) 04/07/2023        Cardiovascular Disease Screening    Lipid Panel  Cholesterol  Lipoprotein (HDL)  Triglycerides  Covered every 5 years for all Medicare beneficiaries without apparent signs or symptoms of cardiovascular disease Lab Results   Component Value Date    CHOLEST 181 12/15/2022    HDL 65 (H) 12/15/2022     (H) 12/15/2022    TRIG 57 12/15/2022         Electrocardiogram (EKG)   Covered if needed at Welcome to Medicare, and non-screening if indicated for medical reasons 04/07/2023      Ultrasound Screening for Abdominal Aortic Aneurysm (AAA) Covered once in a lifetime for one of the following risk factors    Men who are 65-75 years old and have ever smoked    Anyone with a family history -     Colorectal Cancer Screening  Covered for ages 50-85; only need ONE of the following:    Colonoscopy   Covered every 10 years    Covered every 2 years if patient is at high risk or previous colonoscopy was abnormal -    Health Maintenance   Topic Date Due    Colorectal Cancer Screening  07/25/2016       Flexible Sigmoidoscopy   Covered every 4 years -    Fecal Occult Blood Test Covered annually -   Bone Density Screening    Bone density screening    Covered every 2 years after age 65 if diagnosed with risk of osteoporosis or estrogen deficiency.    Covered yearly for long-term glucocorticoid medication use (Steroids) No results found for this or any previous visit.      Health Maintenance Due   Topic Date Due    DEXA Scan  Never done      Pap and Pelvic    Pap   Covered every 2 years for women at normal risk; Annually if at high risk 04/18/2019  Health Maintenance   Topic Date Due    Pap Smear  Never done       Chlamydia Annually if high risk -  No recommendations at this time   Screening Mammogram    Mammogram     Recommend annually for all female patients aged 40 and older    One baseline mammogram covered for patients aged 35-39 02/04/2023    Health Maintenance   Topic Date Due    Mammogram  02/04/2024       Immunizations    Influenza Covered once per flu season  Please get every year -  No recommendations at this time     Pneumococcal Each vaccine ([id number] & [id number]) covered once after 65 Prevnar 13: -    [id number]: -     No recommendations at this time    Hepatitis B One screening covered for patients with certain risk factors   -  No recommendations at this time    Tetanus Toxoid Not covered by Medicare Part B unless medically necessary (cut with metal); may be covered with your pharmacy prescription benefits -    Tetanus, Diptheria and Pertusis TD and TDaP Not covered by Medicare Part B -  No recommendations at this time    Zoster Not covered by Medicare Part B; may be covered with your pharmacy  prescription benefits -  Zoster Vaccines(1 of 2) Never done     Annual Monitoring of Persistent Medications (ACE/ARB, digoxin diuretics, anticonvulsants)    Potassium Annually Lab Results   Component Value Date    K 3.9 04/07/2023         Creatinine   Annually Lab Results   Component Value Date    CREATSERUM 0.79 04/07/2023         BUN Annually Lab Results   Component Value Date    BUN 13 04/07/2023       Drug Serum Conc Annually No results found for: \"DIGOXIN\", \"DIG\", \"VALP\"

## 2024-02-26 ENCOUNTER — OFFICE VISIT (OUTPATIENT)
Dept: DERMATOLOGY CLINIC | Facility: CLINIC | Age: 64
End: 2024-02-26

## 2024-02-26 DIAGNOSIS — Z85.828 HISTORY OF NONMELANOMA SKIN CANCER: ICD-10-CM

## 2024-02-26 DIAGNOSIS — L81.4 LENTIGO: ICD-10-CM

## 2024-02-26 DIAGNOSIS — L82.1 SK (SEBORRHEIC KERATOSIS): Primary | ICD-10-CM

## 2024-02-26 DIAGNOSIS — D23.9 BENIGN NEOPLASM OF SKIN, UNSPECIFIED LOCATION: ICD-10-CM

## 2024-02-26 DIAGNOSIS — Z85.828 ENCOUNTER FOR FOLLOW-UP SURVEILLANCE OF SKIN CANCER: ICD-10-CM

## 2024-02-26 DIAGNOSIS — Z08 ENCOUNTER FOR FOLLOW-UP SURVEILLANCE OF SKIN CANCER: ICD-10-CM

## 2024-02-26 PROCEDURE — 99203 OFFICE O/P NEW LOW 30 MIN: CPT | Performed by: DERMATOLOGY

## 2024-02-26 RX ORDER — TRETINOIN 1 MG/G
1 CREAM TOPICAL NIGHTLY
Qty: 20 G | Refills: 2 | Status: SHIPPED | OUTPATIENT
Start: 2024-02-26

## 2024-03-05 PROBLEM — R92.30 DENSE BREASTS: Status: ACTIVE | Noted: 2024-03-05

## 2024-03-10 NOTE — PROGRESS NOTES
Winnie Cruz is a 63 year old female.  HPI:     CC:    Chief Complaint   Patient presents with    Lesion     \"New Patient\" with referral for skin lesion, from Imtiaz Gupta DO. Patient present with c/o under her L eye, and on her R cheek, near the sideburn. The lesion under her eye has been there for over 7 years. The spot on her R cheek has been previously treated with cryotherapy. Has personal Hx of BCC, diagnosed in 2012. Currently using cerave moisturizing cream.            HISTORY:    Past Medical History:   Diagnosis Date    Cancer (HCC) 2016    basal call on upper lip, and again 2022 per pt    Cervical myofascial pain syndrome 05/02/2013    Chronic pain     right shoulder    COVID-19 05/2022    cough, fever, body aches    DDD (degenerative disc disease), cervical 08/29/2014    Hearing impairment     tinnitus    Osteoarthritis     OTHER DISEASES     shoulder pain    Visual impairment     contacts/glasses      Past Surgical History:   Procedure Laterality Date    BIOPSY OF SKIN LESION  2012    BCC    COLONOSCOPY,DIAGNOSTIC  07/25/2011    Performed by SUSHMA BRAVO at [facility] [place], [facility]    LIPOMA REMOVAL Left 2012    Left Upper Arm    ORAL SURGERY  1984    [facility]      Family History   Problem Relation Age of Onset    Other (alzheimers) Mother     Cancer Father 67        colon cancer    Prostate Cancer Father     Pacemaker Father     Stroke Father     Other (IBS) Sister     Osteoporosis Sister     Stroke Brother     Other (aphasia) Brother     Cancer Maternal Grandmother         uterine cancer      Social History     Socioeconomic History    Marital status: Single   Tobacco Use    Smoking status: Never    Smokeless tobacco: Never   Vaping Use    Vaping Use: Never used   Substance and Sexual Activity    Alcohol use: Never    Drug use: Never   Other Topics Concern    Caffeine Concern Yes     Comment: daily coffee    Exercise Yes     Comment: running 3/4 weekly.     History of tanning Yes     Reaction to local anesthetic No    Pt has a pacemaker No    Pt has a defibrillator No   Social History Narrative    The patient does not use an assistive device..      The patient does live in a home with stairs.        Current Outpatient Medications   Medication Sig Dispense Refill    Tretinoin 0.1 % External Cream Apply 1 Application topically nightly. Use small amount to affected areas 20 g 2    PREGABALIN 75 MG Oral Cap TAKE ONE CAPSULE BY MOUTH ONE TIME DAILY 90 capsule 0    cholecalciferol 25 MCG (1000 UT) Oral Cap Take 1 capsule (1,000 Units total) by mouth daily.      Multiple Vitamins-Minerals (CENTRUM) Oral Tab Take 1 tablet by mouth daily.       Allergies:   Allergies   Allergen Reactions    Cipro Xr [Ciprofloxacin-Cipro Hcl- Cipro Betaine] RASH     Cipro XR       Past Medical History:   Diagnosis Date    Cancer (HCC) 2016    basal call on upper lip, and again 2022 per pt    Cervical myofascial pain syndrome 05/02/2013    Chronic pain     right shoulder    COVID-19 05/2022    cough, fever, body aches    DDD (degenerative disc disease), cervical 08/29/2014    Hearing impairment     tinnitus    Osteoarthritis     OTHER DISEASES     shoulder pain    Visual impairment     contacts/glasses     Past Surgical History:   Procedure Laterality Date    BIOPSY OF SKIN LESION  2012    BCC    COLONOSCOPY,DIAGNOSTIC  07/25/2011    Performed by SUSHMA BRAVO at [facility], [facility]    LIPOMA REMOVAL Left 2012    Left Upper Arm    ORAL SURGERY  1984    [facility] [facility]     Social History     Socioeconomic History    Marital status: Single     Spouse name: Not on file    Number of children: Not on file    Years of education: Not on file    Highest education level: Not on file   Occupational History    Not on file   Tobacco Use    Smoking status: Never    Smokeless tobacco: Never   Vaping Use    Vaping Use: Never used   Substance and Sexual Activity    Alcohol use: Never    Drug use: Never    Sexual activity: Not on file    Other Topics Concern     Service Not Asked    Blood Transfusions Not Asked    Caffeine Concern Yes     Comment: daily coffee    Occupational Exposure Not Asked    Hobby Hazards Not Asked    Sleep Concern Not Asked    Stress Concern Not Asked    Weight Concern Not Asked    Special Diet Not Asked    Back Care Not Asked    Exercise Yes     Comment: running 3/4 weekly.     Bike Helmet Not Asked    Seat Belt Not Asked    Self-Exams Not Asked    Grew up on a farm Not Asked    History of tanning Yes    Outdoor occupation Not Asked    Breast feeding Not Asked    Reaction to local anesthetic No    Pt has a pacemaker No    Pt has a defibrillator No   Social History Narrative    The patient does not use an assistive device..      The patient does live in a home with stairs.     Social Determinants of Health     Financial Resource Strain: Not on file   Food Insecurity: Not on file   Transportation Needs: Not on file   Physical Activity: Not on file   Stress: Not on file   Social Connections: Not on file   Housing Stability: Not on file     Family History   Problem Relation Age of Onset    Other (alzheimers) Mother     Cancer Father 67        colon cancer    Prostate Cancer Father     Pacemaker Father     Stroke Father     Other (IBS) Sister     Osteoporosis Sister     Stroke Brother     Other (aphasia) Brother     Cancer Maternal Grandmother         uterine cancer       There were no vitals filed for this visit.    HPI:  Chief Complaint   Patient presents with    Lesion     \"New Patient\" with referral for skin lesion, from Imtiaz Gupta DO. Patient present with c/o under her L eye, and on her R cheek, near the sideburn. The lesion under her eye has been there for over 7 years. The spot on her R cheek has been previously treated with cryotherapy. Has personal Hx of BCC, diagnosed in 2012. Currently using cerave moisturizing cream.        Concerns as noted history of BCC post excision 2012  Careful with sunscreen.   Concerned with lesion enlarging left cheek.  Had lesion frozen in the past seem to recur larger thicker.    Patient presents with concerns above.    Patient has been in their usual state of health.     Past notes/ records and appropriate/relevant lab results including pathology and past body maps reviewed. Including outside notes/ PCP notes as appropriate. Updated and new information noted in current visit.     ROS:  Denies other relevant systemic complaints.      History, medications, allergies reviewed as noted.    Allergies:  Cipro xr [ciprofloxacin-cipro hcl- cipro betaine]     Physical Examination:     Well-developed well-nourished patient alert oriented in no acute distress.  Exam performed, including scalp, head, neck, face,nails, hair, external eyes, including conjunctival mucosa, eyelids, lips external ears , arms, digits,palms.     Multiple light to medium brown, well marginated, uniformly pigmented, macules and papules 6 mm and less scattered on exam. pigmented lesions examined with dermoscopy benign-appearing patterns.     Waxy tannish keratotic papules scattered, cherry-red vascular papules scattered.    See map today's date for lesions noted .  See assessment and plan below for specific lesions.    Otherwise remarkable for lesions as noted on map.    See A/P  below for additional information:    Assessment / plan:    No orders of the defined types were placed in this encounter.      Meds & Refills for this Visit:  Requested Prescriptions     Signed Prescriptions Disp Refills    Tretinoin 0.1 % External Cream 20 g 2     Sig: Apply 1 Application topically nightly. Use small amount to affected areas         Encounter Diagnoses   Name Primary?    SK (seborrheic keratosis) Yes    Lentigo     Benign neoplasm of skin, unspecified location     History of nonmelanoma skin cancer     Encounter for follow-up surveillance of skin cancer        Larger tannish patch lentiginous changes on dermoscopy.  Waxy tan  papules over the ear.  Right cheek.  Lesion 2 cm.  No atypical features on dermoscopy.    Tretinoin.  If lesions worsen reviewed over-the-counter products vitamin C may use along with tretinoin or additional glycolic acids.    Monitor consider biopsy if lesion continues to enlarge      Pinkish smooth papule at left infraorbital lid for patient has been there for a while monitor 3 mm    Patient with history of skin cancer.  No evidence of recurrence.    No new skin cancer.  Benign-appearing nevi, no atypical features on dermoscopy reassurance given monitor.     Waxy tan keratotic papules lesions in areas of concern as noted reassurance given.  Benign nature discussed.  Possibility of cryo, alphahydroxy acids over-the-counter retinol's discussed.     No other susupicious lesions on todays  exam.    Please refer to map for specific lesions.  See additional diagnoses.  Pros cons of various therapies, risks benefits discussed.Pathophysiology discussed with patient.  Therapeutic options reviewed.  See  Medications in grid.  Instructions reviewed at length.    Benign nevi, seborrheic  keratoses, cherry angiomas:  Reassurance regarding other benign skin lesions.Signs and symptoms of skin cancer, ABCDE's of melanoma discussed with patient. Sunscreen (broad-spectrum, ideally mineral-based-UVA/UVB -SPF 30 or higher) use encouraged, sun protection/sun protective clothing, self exams reviewed Followup as noted RTC ---routine checkup    6 mos -one year or p.r.n.    Encounter Times   Including precharting, reviewing chart, prior notes obtaining history: 10 minutes, medical exam :10 minutes, notes on body map, plan, counseling 10minutes My total time spent caring for the patient on the day of the encounter: 30 minutes     The patient indicates understanding of these issues and agrees to the plan.  The patient is asked to return as noted in follow-up/ above.    This note was generated using Dragon voice recognition software.  Please  contact me regarding any confusion resulting from errors in recognition..  Note to patient and family: The 21st Century Cures Act makes medical notes like these available to patients. However, be advised this is a medical document. It is intended as [id number] communication and monitoring of a patient's care needs. It is written in medical language and may contain abbreviations or verbiage that are unfamiliar. It may appear blunt or direct. Medical documents are intended to carry relevant information, facts as evident and the clinical opinion of the practitioner.

## 2024-03-20 ENCOUNTER — TELEPHONE (OUTPATIENT)
Facility: CLINIC | Age: 64
End: 2024-03-20

## 2024-03-20 NOTE — TELEPHONE ENCOUNTER
Returned patient's call. Reviewed procedure prep instructions, including medications to hold. All questions were answered and patient verbalized understanding.

## 2024-03-22 ENCOUNTER — TELEPHONE (OUTPATIENT)
Facility: CLINIC | Age: 64
End: 2024-03-22

## 2024-03-22 NOTE — TELEPHONE ENCOUNTER
Patient calling to reschedule procedure states father is under going surgery and cannot make it. Please call.

## 2024-04-19 DIAGNOSIS — M79.18 CERVICAL MYOFASCIAL PAIN SYNDROME: ICD-10-CM

## 2024-04-19 DIAGNOSIS — M50.30 DDD (DEGENERATIVE DISC DISEASE), CERVICAL: ICD-10-CM

## 2024-04-19 DIAGNOSIS — G89.29 CHRONIC BILATERAL LOW BACK PAIN WITHOUT SCIATICA: ICD-10-CM

## 2024-04-19 DIAGNOSIS — M54.50 CHRONIC BILATERAL LOW BACK PAIN WITHOUT SCIATICA: ICD-10-CM

## 2024-04-22 RX ORDER — PREGABALIN 75 MG/1
75 CAPSULE ORAL DAILY
Qty: 90 CAPSULE | Refills: 1 | Status: SHIPPED | OUTPATIENT
Start: 2024-04-22

## 2024-04-22 NOTE — TELEPHONE ENCOUNTER
Please review; protocol failed/ has no protocol    Requested Prescriptions   Pending Prescriptions Disp Refills    PREGABALIN 75 MG Oral Cap [Pharmacy Med Name: Pregabalin 75 Mg Cap Nova] 90 capsule 0     Sig: TAKE ONE CAPSULE BY MOUTH ONE TIME DAILY       Controlled Substance Medication Failed - 4/19/2024  5:57 AM        Failed - This medication is a controlled substance - forward to provider to refill       Neurology Medications Passed - 4/19/2024  5:57 AM        Passed - In person appointment or virtual visit in the past 6 mos or appointment in next 3 mos     Recent Outpatient Visits              1 month ago [facility]atosis[facility]    [facility] Visit    3 months ago Encounter for annual health examination    [facility] Imtiaz Gupta DO    Office Visit    4 months ago Cervical myofascial pain syndrome    [facility] Kristine Sprague MD    Office Visit    9 months ago Acute bilateral low back pain without sciatica    [facility] Imtiaz Gupta DO    Office Visit    9 months ago Basal cell carcinoma (BCC) of skin of left upper lip    [facility] Josefa Nuñez MD    Office Visit          Future Appointments         Provider Department Appt Notes    In 3 weeks JESSE Mejia MD [facility] discussion - AM                       Recent Outpatient Visits              1 month ago SK (seborrheic keratosis)    [facility]    Office Visit    3 months ago Encounter for annual health examination    [facility] Imtiaz Gupta DO    Office Visit    4 months ago Cervical myofascial pain syndrome    [facility]  [facility] Kristine Sprague MD    Office Visit    9 months ago Acute bilateral low back pain without sciatica    [facility] Imtiaz Gupta DO    Office Visit    9 months ago Basal cell carcinoma (BCC) of skin of left upper lip    [facility] Josefa Nuñez MD    Office Visit          Future Appointments         Provider Department Appt Notes    In 3 weeks JESSE Mejia MD [facility] CRC discussion - AM

## 2024-04-22 NOTE — TELEPHONE ENCOUNTER
Patient called, verified Name and . Following up on refill of pregabalin. Relayed that prescription was sent this morning. States she is at the pharmacy right now and was told that she needs authorization.     Called [facility] and spoke to Valentin Sexton. Relayed that prescription was sent this morning. They do not have the prescription. Script called in.     Attempted to call the patient to update that prescription will be ready in 30 minutes per pharmacy. Do not leave message per ROBI. Left message to call back.

## 2024-04-22 NOTE — TELEPHONE ENCOUNTER
Patient is calling requesting update on medication refill patient stated she is out of medication and urgently needs refill. Please assist.

## 2024-04-22 NOTE — TELEPHONE ENCOUNTER
Patient called, verified Name and . She is following up on refill of pregabalin. She is out of medication. Relayed we are awaiting for provider's review and approval of refill request. Pharmacy verified. Patient verbalized understanding and had no further questions at this time.

## 2024-05-16 ENCOUNTER — TELEPHONE (OUTPATIENT)
Facility: CLINIC | Age: 64
End: 2024-05-16

## 2024-05-16 ENCOUNTER — OFFICE VISIT (OUTPATIENT)
Facility: CLINIC | Age: 64
End: 2024-05-16

## 2024-05-16 VITALS
SYSTOLIC BLOOD PRESSURE: 120 MMHG | HEIGHT: 63.5 IN | HEART RATE: 71 BPM | DIASTOLIC BLOOD PRESSURE: 69 MMHG | BODY MASS INDEX: 20 KG/M2

## 2024-05-16 DIAGNOSIS — Z80.0 FH: COLON CANCER: ICD-10-CM

## 2024-05-16 DIAGNOSIS — K59.04 CHRONIC IDIOPATHIC CONSTIPATION: Primary | ICD-10-CM

## 2024-05-16 DIAGNOSIS — Z12.11 COLON CANCER SCREENING: Primary | ICD-10-CM

## 2024-05-16 PROCEDURE — 99213 OFFICE O/P EST LOW 20 MIN: CPT | Performed by: INTERNAL MEDICINE

## 2024-05-16 RX ORDER — SODIUM, POTASSIUM,MAG SULFATES 17.5-3.13G
SOLUTION, RECONSTITUTED, ORAL ORAL
Qty: 1 EACH | Refills: 0 | Status: SHIPPED | OUTPATIENT
Start: 2024-05-16

## 2024-05-16 NOTE — PROGRESS NOTES
[facility] - Gastroenterology                                                                                                      Clinic Follow-up Visit          Subjective/HPI:   Winnie Cruz is a 63 year old year-old female with history of DDD, skin cancer, who presents for constipation, family hx of CRC.     Patient denies any GI symptoms of nausea, vomiting, dyspepsia, dysphagia, hematemesis, abdominal pain, change in bowel habits, thin stools, hematochezia, or melena.  Additionally there is no weight loss and no reported history of chest pain or shortness of breath.  -no significant constipation issues.    >>Does have constipation issues, harder stool, less fiber in her diet  >>Patient's father  of bowel obstruction/PNA in [place] in March of these year so could no come in for colonoscopy.      Prior endoscopies:  CLN - no polyps ()     Soc:  -No smoking  -No Etoh      -Family hx of CRC (father)        Wt Readings from Last 6 Encounters:   23 119 lb (54 kg)   23 119 lb (54 kg)   23 119 lb (54 kg)   23 120 lb (54.4 kg)   23 118 lb (53.5 kg)   12/15/22 118 lb (53.5 kg)        History, Medications, Allergies, ROS:      Past Medical History:    Cancer (HCC)    basal call on upper lip, and again  per pt    Cervical myofascial pain syndrome    Chronic pain    right shoulder    COVID-19    cough, fever, body aches    DDD (degenerative disc disease), cervical    Hearing impairment    tinnitus    Osteoarthritis    OTHER DISEASES    shoulder pain    Visual impairment    contacts/glasses      Past Surgical History:   Procedure Laterality Date    Biopsy of skin lesion      BCC    Colonoscopy,diagnostic  2011    Performed by [facility] BRAVO [facility]    Lipoma removal Left 2012    Left Upper Arm    Oral surgery  1984    [facility] [facility]      Family History    Problem Relation Age of Onset    Other (alzheimers) Mother     Cancer Father 67        colon cancer    Prostate Cancer Father     Pacemaker Father     Stroke Father     Other (IBS) Sister     Osteoporosis Sister     Stroke Brother     Other (aphasia) Brother     Cancer Maternal Grandmother         uterine cancer      Social History:   Social History     Socioeconomic History    Marital status: Single   Tobacco Use    Smoking status: Never    Smokeless tobacco: Never   Vaping Use    Vaping status: Never Used   Substance and Sexual Activity    Alcohol use: Never    Drug use: Never   Other Topics Concern    Caffeine Concern Yes     Comment: daily coffee    Exercise Yes     Comment: running 3/4 weekly.     History of tanning Yes    Reaction to local anesthetic No    Pt has a pacemaker No    Pt has a defibrillator No   Social History Narrative    The patient does not use an assistive device..      The patient does live in a home with stairs.        Medications (Active prior to today's visit):  Current Outpatient Medications   Medication Sig Dispense Refill    pregabalin 75 MG Oral Cap Take 1 capsule (75 mg total) by mouth daily. 90 capsule 1    Tretinoin 0.1 % External Cream Apply 1 Application topically nightly. Use small amount to affected areas 20 g 2    cholecalciferol 25 MCG (1000 UT) Oral Cap Take 1 capsule (1,000 Units total) by mouth daily.      Multiple Vitamins-Minerals (CENTRUM) Oral Tab Take 1 tablet by mouth daily.         Allergies:  Allergies   Allergen Reactions    Cipro Xr [Ciprofloxacin-Cipro Hcl- Cipro Betaine] RASH     Cipro XR       ROS:   CONSTITUTIONAL:  negative for fevers, chills  EYES:  negative for change in vision  RESPIRATORY:  negative for  shortness of breath  CARDIOVASCULAR:  negative for  chest pain  GASTROINTESTINAL:  see HPI  GENITOURINARY:  negative for dysuria  INTEGUMENT/BREAST:  SKIN:  negative for  rash  ALLERGIC/IMMUNOLOGIC:  negative for hay fever  ENDOCRINE:  negative for cold  intolerance and heat intolerance  MUSCULOSKELETAL:  negative for  joint stiffness and joint swelling  BEHAVIOR/PSYCH:  negative for depressed mood    PHYSICAL EXAM:   Blood pressure 120/69, pulse 71, height 5' 3.5\" (1.613 m), not currently breastfeeding.    Gen- Patient appears comfortable and in no acute discomfort  HEENT: the sclera appears anicteric, oropharynx clear, mucus membranes appear moist  CV- regular rate and rhythm, the extremities are warm and well perfused   Lung- Moves air well; No labored breathing  Abdomen- soft, non-tender exam in all quadrants without rigidity or guarding, non-distended, no abnormal bowel sounds noted, no masses are palpated  Skin- No jaundice  Ext: no cyanosis, clubbing or edema is evident.   Neuro- Alert and oriented x4, and patient is having movement of all 4 extremities   Psych - appropriate, non-agitated    Labs/Imaging:     Patient's labs and imaging were reviewed and discussed with patient today.     .  ASSESSMENT/PLAN:   Winnie Cruz is a 63 year old year-old female with history of DDD, skin cancer, who presents for constipation, family hx of CRC.       #CRC (Father)  #Chronic constipation - start fiber powder regularly    Recommend:    1. Schedule colonoscopy with [facility] [Diagnosis: Screening, family hx of CRC]    2.  bowel prep from pharmacy (split Suprep)  -Buy over the counter dulcolax laxative, and take one tablet daily for 3 days prior to drinking the bowel prep.     3. Continue all medications as normal for your procedure.    4. Read all bowel prep instructions carefully. Bowel prep instructions can also be found online at:  [email or web address]     5. AVOID seeds, nuts, popcorn, raw fruits and vegetables for 3 days before procedure    6. You MAY need to go for COVID testing 72 hours before procedure. The testing team will call you a few days before your procedure to discuss with you if testing is required. If you are asked to go for COVID testing and do  not completed the test, the procedure cannot be performed.     7. If you start any NEW medication after your visit today, please notify us. Certain medications (like iron or weight loss medications) will need to be held before the procedure, or the procedure cannot be performed safely.        Orders This Visit:  No orders of the defined types were placed in this encounter.      Meds This Visit:  Requested Prescriptions      No prescriptions requested or ordered in this encounter       Imaging & Referrals:  None     5/16/2024    SUSAN Mejia MD  Pager: [phone]        This note was partially prepared using Dragon Medical voice recognition dictation software. As a result, errors may occur. When identified, these errors have been corrected. While every attempt is made to correct errors during dictation, discrepancies may still exist.

## 2024-05-16 NOTE — PATIENT INSTRUCTIONS
1. Schedule colonoscopy with [facility] [Diagnosis: Screening, family hx of CRC]    2.  bowel prep from pharmacy ([facility])  -Buy over the counter dulcolax laxative, and take one tablet daily for 3 days prior to drinking the bowel prep.     3. Continue all medications as normal for your procedure.    4. Read all bowel prep instructions carefully. Bowel prep instructions can also be found online at:  [email or web address]     5. AVOID seeds, nuts, popcorn, raw fruits and vegetables for 3 days before procedure    6. You MAY need to go for COVID testing 72 hours before procedure. The testing team will call you a few days before your procedure to discuss with you if testing is required. If you are asked to go for COVID testing and do not completed the test, the procedure cannot be performed.     7. If you start any NEW medication after your visit today, please notify us. Certain medications (like iron or weight loss medications) will need to be held before the procedure, or the procedure cannot be performed safely.

## 2024-05-16 NOTE — TELEPHONE ENCOUNTER
1. Schedule colonoscopy with [facility] [Diagnosis: Screening, family hx of CRC]     2.  bowel prep from pharmacy ([facility])  -Buy over the counter dulcolax laxative, and take one tablet daily for 3 days prior to drinking the bowel prep.      3. Continue all medications as normal for your procedure.     4. Read all bowel prep instructions carefully. Bowel prep instructions can also be found online at:  [email or web address]      5. AVOID seeds, nuts, popcorn, raw fruits and vegetables for 3 days before procedure     6. You MAY need to go for COVID testing 72 hours before procedure. The testing team will call you a few days before your procedure to discuss with you if testing is required. If you are asked to go for COVID testing and do not completed the test, the procedure cannot be performed.      7. If you start any NEW medication after your visit today, please notify us. Certain medications (like iron or weight loss medications) will need to be held before the procedure, or the procedure cannot be performed safely.

## 2024-05-16 NOTE — TELEPHONE ENCOUNTER
Scheduled for:  Colonoscopy [address]2412  Provider Name:  Dr. Mejia   Date:  9/18/2024  Location:    [facility]  Sedation:  Mac  Time:  12:30 (patient is aware arrival time is 11:30)  Prep:  -Buy over the counter dulcolax laxative, and take one tablet daily for 3 days prior to drinking the bowel prep and Suprep  Meds/Allergies Reconciled?:  Physician reviewed   Diagnosis with codes:  Colon cancer screening Z12.11 Family Hx of colon cancer Z80.0  Was patient informed to call insurance with codes (Y/N):  Yes, I confirmed Medicare insurance with the patient.   Referral sent?:  Referral was sent at the time of electronic surgical scheduling.   EM or [facility] notified?:  I sent an electronic request to Endo Scheduling and received a confirmation today.   Medication Orders:  This patient verbally confirmed that she is not taking:   Iron, blood thinners, BP meds, and is not diabetic   Not latex allergy, Not PCN allergy and does not have a pacemaker  Misc Orders:  I discussed the prep instructions with the patient which she verbally understood and is aware that I will mail the instructions today.  Further instructions given by staff:

## 2024-05-16 NOTE — TELEPHONE ENCOUNTER
Patient returning missed call to schedule procedure,  will try nurse line. Please call at [phone], thanks.

## 2024-07-18 ENCOUNTER — TELEPHONE (OUTPATIENT)
Facility: CLINIC | Age: 64
End: 2024-07-18

## 2024-07-18 DIAGNOSIS — G89.4 CHRONIC PAIN SYNDROME: Primary | ICD-10-CM

## 2024-07-18 RX ORDER — PREGABALIN 50 MG/1
50 CAPSULE ORAL DAILY
Qty: 30 CAPSULE | Refills: 0 | Status: SHIPPED | OUTPATIENT
Start: 2024-07-18

## 2024-07-18 NOTE — TELEPHONE ENCOUNTER
Patient would like to talk with Dr Gupta to discuss her pregabalin medication.   She would like to decrease the  dose, currently at 75 mg, she only has 3 pills left and would need a refill.BUT, prefers to talk to Dr Gupta first .      Future Appointments   Date Time Provider Department Center   9/18/2024 12:30 PM TERRELL, PROCEDURE [id number] None

## 2024-07-18 NOTE — TELEPHONE ENCOUNTER
Spoke to pt over phone. She would like to decrease pregabalin dose to 50mg daily. Rx sent. She will update with any worsening in pain.

## 2024-07-22 RX ORDER — PREGABALIN 75 MG/1
75 CAPSULE ORAL DAILY
Qty: 90 CAPSULE | Refills: 0 | Status: SHIPPED | OUTPATIENT
Start: 2024-08-20

## 2024-07-22 RX ORDER — PREGABALIN 25 MG/1
25 CAPSULE ORAL DAILY
Qty: 29 CAPSULE | Refills: 0 | Status: SHIPPED | OUTPATIENT
Start: 2024-07-22

## 2024-07-22 NOTE — TELEPHONE ENCOUNTER
Patient stated that took the pregabalin 50mg dose last night to try but this morning having more discomfort in her neck and trapezius area. Wanted to get a prescription for 25mg pregabalin for 29 days and then return to 75mg capsule for 90 days. Prescriptions pended. Please advise.

## 2024-08-12 ENCOUNTER — LAB ENCOUNTER (OUTPATIENT)
Dept: LAB | Facility: REFERENCE LAB | Age: 64
End: 2024-08-12
Attending: FAMILY MEDICINE
Payer: MEDICARE

## 2024-08-12 ENCOUNTER — NURSE TRIAGE (OUTPATIENT)
Facility: CLINIC | Age: 64
End: 2024-08-12

## 2024-08-12 DIAGNOSIS — E53.8 VITAMIN B12 DEFICIENCY: ICD-10-CM

## 2024-08-12 LAB — VIT B12 SERPL-MCNC: 837 PG/ML (ref 211–911)

## 2024-08-12 PROCEDURE — 82607 VITAMIN B-12: CPT

## 2024-08-12 PROCEDURE — 36415 COLL VENOUS BLD VENIPUNCTURE: CPT

## 2024-08-12 NOTE — TELEPHONE ENCOUNTER
Chart reviewed. It appears Patient has been added on to schedule already at 12pm. (See appointment log)

## 2024-08-12 NOTE — TELEPHONE ENCOUNTER
Please add on today at Noon (virtual or in-office, whichever she prefers). Okay to double book. Please let pt know.

## 2024-08-12 NOTE — TELEPHONE ENCOUNTER
Action Requested: Summary for Provider     []  Critical Lab, Recommendations Needed  [] Need Additional Advice  []   FYI    []   Need Orders  [] Need Medications Sent to Pharmacy  []  Other     SUMMARY:Pt requesting an Appt today- x 3 weeks, symptoms of Depression,low mood, no energy, have to push herself, very tearful on the phone- recently lost her father and previous before that her mother, lives alone, spending too much time  with herself, relayed Alymirta Dodson number, CSSR score -low no plan to hurt herself but think sometimes it would be easier to just not be here   Please advise      Reason for call: Depression  Onset: 3 weeks                    Reason for Disposition   Patient wants to be seen    Protocols used: Depression-A-OH

## 2024-08-13 ENCOUNTER — TELEPHONE (OUTPATIENT)
Facility: CLINIC | Age: 64
End: 2024-08-13

## 2024-08-13 NOTE — TELEPHONE ENCOUNTER
Patient calling back for test results.   Notified of below and states understanding.   No there questions or concerns at this time.     Omayra Torres  8/13/2024  9:30 AM CDT Back to Top      Left a message to call back    Imtiaz Gupta DO  8/13/2024  9:16 AM CDT       Please let pt know her B12 level is normal. Thank you.

## 2024-08-18 ENCOUNTER — TELEPHONE (OUTPATIENT)
Age: 64
End: 2024-08-18

## 2024-08-18 NOTE — TELEPHONE ENCOUNTER
Hello,     The [facility] Navigation team has attempted to reach you regarding an order from Dr. Gupta's office. We are reaching out in order to assist you in coordinating care and resources that may meet your needs. Please give our office a call at [phone]. For more immediate assistance you can contact our 24-hour help line at [phone]. We look forward to hearing from you soon.

## 2024-08-20 ENCOUNTER — TELEPHONE (OUTPATIENT)
Age: 64
End: 2024-08-20

## 2024-08-20 NOTE — TELEPHONE ENCOUNTER
Therapy Resources:    [facility] (specialty in caregiver support, grief, chronic illness)  [address]  [place] [address]  Phone: [phone] or [phone]      Dr. Anai Perez, [facility]  [address]  [place], [place] [id number] [phone]    [facility]- Marli Melendez (pain specialty)  [address] [place] [id number] [phone]        For more intensive therapy, these programs accept Medicare:    [facility]  [address] [place], [place]   [phone]

## 2024-08-28 ENCOUNTER — NURSE TRIAGE (OUTPATIENT)
Facility: CLINIC | Age: 64
End: 2024-08-28

## 2024-08-28 ENCOUNTER — OFFICE VISIT (OUTPATIENT)
Dept: FAMILY MEDICINE CLINIC | Facility: CLINIC | Age: 64
End: 2024-08-28
Payer: MEDICARE

## 2024-08-28 VITALS
DIASTOLIC BLOOD PRESSURE: 66 MMHG | OXYGEN SATURATION: 98 % | TEMPERATURE: 98 F | SYSTOLIC BLOOD PRESSURE: 120 MMHG | RESPIRATION RATE: 16 BRPM | HEIGHT: 63.5 IN | BODY MASS INDEX: 20 KG/M2 | HEART RATE: 72 BPM
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
## 2024-08-28 PROCEDURE — 99212 OFFICE O/P EST SF 10 MIN: CPT | Performed by: NURSE PRACTITIONER

## 2024-08-28 NOTE — PROGRESS NOTES
CHIEF COMPLAINT:     Chief Complaint   Patient presents with    Bite     Sx 10 days - Itchy red bites on back and R shoulder  Sx Saturday - Noticed same itchy, red bites on L shoulder, R underarm, and L forearm. Redness around area has increased  Denies fever, chills, body aches  No OTC       HPI:     Winnie Cruz is a 63 year old female who presents with several insect bites.  Onset 3 days.  Had some a week or so ago but they resolved.  The bites are mildly itchy.  She did not see anything bite her, thinks it may be itch mites.  Denies fever/systemic symptoms.  Has applied ice.      Current Outpatient Medications   Medication Sig Dispense Refill    pregabalin 75 MG Oral Cap Take 1 capsule (75 mg total) by mouth daily. 90 capsule 0    cholecalciferol 25 MCG (1000 UT) Oral Cap Take 1 capsule (1,000 Units total) by mouth daily.      Multiple Vitamins-Minerals (CENTRUM) Oral Tab Take 1 tablet by mouth daily.      FLUoxetine 10 MG Oral Cap Take 1 capsule (10 mg total) by mouth daily. (Patient not taking: Reported on 8/28/2024) 90 capsule 0      Past Medical History:    Cancer (HCC)    basal call on upper lip, and again 2022 per pt    Cervical myofascial pain syndrome    Chronic pain    right shoulder    COVID-19    cough, fever, body aches    DDD (degenerative disc disease), cervical    Hearing impairment    tinnitus    Osteoarthritis    OTHER DISEASES    shoulder pain    Visual impairment    contacts/glasses      Social History:  Social History     Socioeconomic History    Marital status: Single   Tobacco Use    Smoking status: Never    Smokeless tobacco: Never   Vaping Use    Vaping status: Never Used   Substance and Sexual Activity    Alcohol use: Never    Drug use: Never   Other Topics Concern    Caffeine Concern Yes     Comment: daily coffee    Exercise Yes     Comment: running 3/4 weekly.     History of tanning Yes    Reaction to local anesthetic No    Pt has a pacemaker No    Pt has a defibrillator No   Social  History Narrative    The patient does not use an assistive device..      The patient does live in a home with stairs.        REVIEW OF SYSTEMS:   GENERAL: feels well otherwise, no fever, no chills.  SKIN: as above.  CHEST: no chest pains, no palpitations.  LUNGS: denies shortness of breath with exertion or rest. No wheezing, no cough.  LYMPH: No enlargement of the lymph nodes.  MUSC/SKEL: no joint swelling, no joint stiffness.  CARDIOVASCULAR: denies chest pain on exertion or rest.  GI: no nausea, no vomiting or abdominal pain  NEURO: no abnormal sensation, no tingling of the skin or numbness.    EXAM:   /66   Pulse 72   Temp 97.7 °F (36.5 °C)   Resp 16   Ht 5' 3.5\" (1.613 m)   SpO2 98%   BMI 20.05 kg/m²   GENERAL: Well-appearing, well developed, well nourished, and in no apparent distress  SKIN: +4 wheels with central punctum- One to left forearm, one to left breast, one to right armpit, one to right collarbone area. + Pruritic.  Each approx. 1.5 inches in diameter.  EXTREMITIES: no cyanosis, clubbing or edema.  Cap refill brisk- less than 2 seconds.   LYMPH: No lymphadenopathy.      ASSESSMENT AND PLAN:     ASSESSMENT:   Encounter Diagnosis   Name Primary?    Multiple insect bites Yes       PLAN:   - Discussed probable itch mite bites given high prevalence in the area.  - Advised cortisone, ice, otc antihistamine prn.  - Skin care discussed with patient.   - The patient was advised to return in 3 days if no improvement/worsening.  Advise ER if ever new systemic symptoms/fever or streaking from wound.  - The patient indicates understanding of these issues and agrees to the plan.    Requested Prescriptions      No prescriptions requested or ordered in this encounter       There are no Patient Instructions on file for this visit.

## 2024-08-28 NOTE — TELEPHONE ENCOUNTER
Action Requested: Summary for Provider     []  Critical Lab, Recommendations Needed  [] Need Additional Advice  [x]   FYI    []   Need Orders  [] Need Medications Sent to Pharmacy  []  Other     SUMMARY: Patient stated that has a few bite marks on different parts of her body.  At first thought it might be the Oak mites. Area is red and redness is increasing. The area is about 2 inches. Patient was at the [facility] on [facility]/17/[facility]24, not sure if got a tick bite. No other symptoms. Patient wanted to be seen. No appointments available today in [place]-soonest available is tomorrow morning. Patient preferred to be seen today. Advised patient that can go to walk in clinic in [place](same copay as office visit) or urgent care in [place] for an evaluation today. Patient verbalized understanding and given address to walk in clinic.  Reason for call: Bite Sting,Insect  Onset: Aug 18, 2024    Reason for Disposition   Patient wants to be seen    Protocols used: Insect Bite-A-OH

## 2024-08-28 NOTE — TELEPHONE ENCOUNTER
Patient seen in walk in clinic today.    ASSESSMENT:        Encounter Diagnosis   Name Primary?    Multiple insect bites Yes         PLAN:   - Discussed probable itch mite bites given high prevalence in the area.  - Advised cortisone, ice, otc antihistamine prn.  - Skin care discussed with patient.   - The patient was advised to return in 3 days if no improvement/worsening.  Advise ER if ever new systemic symptoms/fever or streaking from wound.  - The patient indicates understanding of these issues and agrees to the plan.

## 2024-09-12 ENCOUNTER — TELEPHONE (OUTPATIENT)
Facility: CLINIC | Age: 64
End: 2024-09-12

## 2024-09-12 NOTE — TELEPHONE ENCOUNTER
GI Scheduling Staff:   Okay to take only two days of dulcolax before prep.    With regard to the pregabalin, she should take it the night before procedure as normal AFTER she finishes her prep that evening. Should be absorbed normally.

## 2024-09-12 NOTE — TELEPHONE ENCOUNTER
Dr. Mejia-   Patient is asking if she can only take the dulcolax for two days prior instead of the three days. Patient is a runner and she is worried it will kick in when she is not by a bathroom.     Patient states she takes pregabalin in the evening before bed. Patient is asking if taking the bowel prep will effect the absorption of this medication. Please advise.   Thank you!

## 2024-09-17 ENCOUNTER — TELEPHONE (OUTPATIENT)
Facility: CLINIC | Age: 64
End: 2024-09-17

## 2024-09-17 NOTE — TELEPHONE ENCOUNTER
Spoke patient who had an issue with the time she was given by endoscopy department.     Patient's procedure is at 10:45am with an arrival time of 9:45am at [facility]. I provided patient the number to endoscopy department to speak with them regarding time.

## 2024-09-18 ENCOUNTER — HOSPITAL ENCOUNTER (OUTPATIENT)
Age: 64
Setting detail: HOSPITAL OUTPATIENT SURGERY
Discharge: HOME | End: 2024-09-18
Attending: INTERNAL MEDICINE | Admitting: INTERNAL MEDICINE
Payer: MEDICARE

## 2024-09-18 ENCOUNTER — ANESTHESIA EVENT (OUTPATIENT)
Dept: ENDOSCOPY | Age: 64
End: 2024-09-18
Payer: MEDICARE

## 2024-09-18 ENCOUNTER — ANESTHESIA (OUTPATIENT)
Dept: ENDOSCOPY | Age: 64
End: 2024-09-18
Payer: MEDICARE

## 2024-09-18 VITALS
SYSTOLIC BLOOD PRESSURE: 123 MMHG | RESPIRATION RATE: 13 BRPM | OXYGEN SATURATION: 100 % | WEIGHT: 112.38 LBS | HEIGHT: 63 IN | HEART RATE: 58 BPM | TEMPERATURE: 97 F | DIASTOLIC BLOOD PRESSURE: 63 MMHG | BODY MASS INDEX: 19.91 KG/M2

## 2024-09-18 DIAGNOSIS — Z80.0 FH: COLON CANCER: ICD-10-CM

## 2024-09-18 DIAGNOSIS — Z12.11 COLON CANCER SCREENING: ICD-10-CM

## 2024-09-18 PROBLEM — K63.5 POLYP OF ASCENDING COLON: Status: ACTIVE | Noted: 2024-09-18

## 2024-09-18 PROCEDURE — 45385 COLONOSCOPY W/LESION REMOVAL: CPT | Performed by: INTERNAL MEDICINE

## 2024-09-18 PROCEDURE — 99070 SPECIAL SUPPLIES PHYS/QHP: CPT | Performed by: INTERNAL MEDICINE

## 2024-09-18 RX ORDER — LIDOCAINE HYDROCHLORIDE 10 MG/ML
INJECTION, SOLUTION EPIDURAL; INFILTRATION; INTRACAUDAL; PERINEURAL AS NEEDED
Status: DISCONTINUED | OUTPATIENT
Start: 2024-09-18 | End: 2024-09-18 | Stop reason: SURG

## 2024-09-18 RX ORDER — NALOXONE HYDROCHLORIDE 0.4 MG/ML
0.08 INJECTION, SOLUTION INTRAMUSCULAR; INTRAVENOUS; SUBCUTANEOUS ONCE AS NEEDED
OUTPATIENT
Start: 2024-09-18 | End: 2024-09-18

## 2024-09-18 RX ORDER — SODIUM CHLORIDE, SODIUM LACTATE, POTASSIUM CHLORIDE, CALCIUM CHLORIDE 600; 310; 30; 20 MG/100ML; MG/100ML; MG/100ML; MG/100ML
INJECTION, SOLUTION INTRAVENOUS CONTINUOUS
OUTPATIENT
Start: 2024-09-18

## 2024-09-18 RX ORDER — SODIUM CHLORIDE, SODIUM LACTATE, POTASSIUM CHLORIDE, CALCIUM CHLORIDE 600; 310; 30; 20 MG/100ML; MG/100ML; MG/100ML; MG/100ML
INJECTION, SOLUTION INTRAVENOUS CONTINUOUS
Status: DISCONTINUED | OUTPATIENT
Start: 2024-09-18 | End: 2024-09-18

## 2024-09-18 RX ADMIN — LIDOCAINE HYDROCHLORIDE 50 MG: 10 INJECTION, SOLUTION EPIDURAL; INFILTRATION; INTRACAUDAL; PERINEURAL at 12:39:00

## 2024-09-18 RX ADMIN — SODIUM CHLORIDE, SODIUM LACTATE, POTASSIUM CHLORIDE, CALCIUM CHLORIDE: 600; 310; 30; 20 INJECTION, SOLUTION INTRAVENOUS at 12:39:00

## 2024-09-18 NOTE — H&P
History & Physical Examination    Patient Name: Winnie Cruz  MRN: [id number]  CSN: [id number]  YOB: 1960    Diagnosis: screening for colon cancer    Medications Prior to Admission   Medication Sig Dispense Refill Last Dose    pregabalin 75 MG Oral Cap Take 1 capsule (75 mg total) by mouth daily. 90 capsule 0 9/17/2024    cholecalciferol 25 MCG (1000 UT) Oral Cap Take 1 capsule (1,000 Units total) by mouth daily.   9/11/2024    Multiple Vitamins-Minerals (CENTRUM) Oral Tab Take 1 tablet by mouth daily.   9/11/2024    FLUoxetine 10 MG Oral Cap Take 1 capsule (10 mg total) by mouth daily. (Patient not taking: Reported on 8/28/2024) 90 capsule 0      Current Facility-Administered Medications   Medication Dose Route Frequency    lactated ringers infusion   Intravenous Continuous       Allergies:   Allergies   Allergen Reactions    Cipro Xr [Ciprofloxacin-Cipro Hcl- Cipro Betaine] RASH     Cipro XR       Past Medical History:    Cancer (HCC)    basal call on upper lip, and again 2022 per pt    Cervical myofascial pain syndrome    Chronic pain    right shoulder    COVID-19    cough, fever, body aches    DDD (degenerative disc disease), cervical    Hearing impairment    tinnitus    Osteoarthritis    OTHER DISEASES    shoulder pain    Visual impairment    contacts/glasses     Past Surgical History:   Procedure Laterality Date    Biopsy of skin lesion  2012    BCC    Colonoscopy,diagnostic  07/25/2011    Performed by SUSHMA BRAVO at [facility], [facility]    Lipoma removal Left 2012    Left Upper Arm    Oral surgery  1984    [place] [facility]     Family History   Problem Relation Age of Onset    Other (alzheimers) Mother     Cancer Father 67        colon cancer    Prostate Cancer Father     Pacemaker Father     Stroke Father     Other (IBS) Sister     Osteoporosis Sister     Stroke Brother     Other (aphasia) Brother     Cancer Maternal Grandmother         uterine cancer     Social History     Tobacco Use     [facility] SUBSEQUENT VISIT NOTE     Primary Care Physician: Madeline Gan CNP  Office Phone #: [phone]  Fax Phone #: [phone]    Chief Complaint   Patient presents with   • APN follow-up       Patient with medical history inclusive of of HTN, dementia, gout, HL,intertrochanteric fracture of the left hip s/p surgery in 2023. Patient noted sitting up in a chair. She is in the company of her homemaker. Patient is in no apparent address.Breathing is even and unlabored. She is alert x1.  Caregiver reports patient with a very good appetite. She denies any falls or injuries.she reports patient sleeps well, and having regular BM..Patient and caregiver denies any other concerns. Called patient's grand daughter, unable to reach.             Radha is a 83 year old female seen for follow up visit.       Patient has a significant medical history for but not limited to:  Past Medical History:   Diagnosis Date   • Acute on chronic kidney failure (CMD)    • Altered mental status 02/29/2024   • Anemia    • Bradycardia    • Deep tissue injury 01/11/2024   • Dementia  (CMD)    • Eczema 11/12/2010   • Essential (primary) hypertension    • Fracture 12/30/2023   • Gout    • High cholesterol    • HLD (hyperlipidemia)    • HTN (hypertension)    • Kidney cysts 02/20/2020   • Leukocytosis    • Pulmonary nodule    • Syncope and collapse 11/03/2022   • Vitamin D deficiency        HX Obtained by: Patient, Chart Review, and Family    Patient Care Team:  Madeline Gan CNP as PCP - General (Nurse Practitioner - Family)  Madeline Gan CNP as [facility]: Clinician (Nurse Practitioner - Family)  Michelle Tucker MSW as [facility]: SW (Social Work)  Rebecca Fajardo, RN as [facility]: RN (Registered Nurse)  Bebeto Almanzar DPM as Referring Provider (Podiatry)  Madeline Gan CNP (Nurse Practitioner - Family)    Advance Care Planning      Location: [facility] [place] [id number]  Branding  Patient Location: Home  Advance care planning documents on file - yes    Advance care planning discussion offered to participants: Madeline Gan, CNP, Guardian could not participate in discussion due to not being around.                  Patient's medications, allergies, past medical, surgical, social and family histories were reviewed and updated as appropriate.    Review of Systems   Constitutional:  Positive for activity change. Negative for appetite change.   HENT:  Positive for dental problem.    Eyes:  Positive for redness and itching.   Musculoskeletal:  Positive for gait problem.   Skin:  Positive for wound.   Neurological:  Positive for weakness (lower ext).   Psychiatric/Behavioral:  Positive for confusion.    All other systems reviewed and are negative.       Current Outpatient Medications   Medication Sig Dispense Refill   • metoPROLOL succinate (TOPROL-XL) 50 MG 24 hr tablet Take 1 tablet by mouth once daily 90 tablet 0   • erythromycin (ILOTYCIN) ophthalmic ointment Apply 1 cm strip to affected eye every 4-6 hours for 5 days. 3.5 g 0   • magnesium oxide (Magnesium Oxide 400) 240 MG Pack Take 400 mg by mouth daily. 90 each 0   • apixaBAN (ELIQUIS) 2.5 MG Tab Take 1 tablet by mouth every 12 hours. 180 tablet 3   • hydrALAZINE (APRESOLINE) 25 MG tablet Take 25 mg by mouth 3 times daily as needed. NOT TAKING     • ferrous sulfate 325 (65 FE) MG EC tablet Take 325 mg by mouth daily (with breakfast).     • amLODIPine (NORVASC) 10 MG tablet Take 10 mg by mouth daily.     • traMADol (ULTRAM) 50 MG tablet Take 0.5 tablets by mouth every 6 hours as needed for Pain. (Patient taking differently: Take 0.5 tablets by mouth every 6 hours as needed for Pain. moderate pain) 30 tablet 0   • acetaminophen (TYLENOL) 325 MG tablet Take 2 tablets by mouth every 4 hours as needed for Pain. (Patient taking differently: Take 650 mg by mouth every 4 hours as needed for Pain. moderate pain) 30 tablet 0   •  Smoking status: Never    Smokeless tobacco: Never   Substance Use Topics    Alcohol use: Never       SYSTEM Check if Review is Normal Check if Physical Exam is Normal If not normal, please explain:   HEENT [X ] [ X]    NECK  [X ] [ X]    HEART [X ] [ X]    LUNGS [X ] [ X]    ABDOMEN [X ] [ X]    EXTREMITIES [X ] [ X]    OTHER        I have discussed the risks and benefits and alternatives of the procedure with the patient/family.  They understand and agree to proceed with plan of care.   I have reviewed the History and Physical done within the last 30 days.  Any changes noted above.    SUSAN Mejia MD  [facility] - Gastroenterology  9/18/2024  12:39 PM             polyethylene glycol (MIRALAX) 17 g packet Take 17 g by mouth daily as needed (constipation). Stir and dissolve powder in any 4 to 8 ounces of beverage, then drink. 30 each 0   • sennosides-docusate sodium (SENOKOT-S) 8.6-50 MG tablet Take 1 tablet by mouth 2 times daily as needed for Constipation. 60 tablet 3   • Diclofenac Sodium 1 % Cream Apply 1 % topically as needed (mild joint pain). NOT TAKING  Indications: Joint Damage causing Pain and Loss of Function     • allopurinol (ZYLOPRIM) 300 MG tablet Take 300 mg by mouth daily. Indications: NOT TAKING     • pantoprazole (PROTONIX) 40 MG tablet Take 1 tablet by mouth daily. (Patient not taking: Reported on 7/21/2024) 90 tablet 3   • Cholecalciferol 50 mcg (2,000 units) tablet Take 1 tablet by mouth daily. NOT TAKING 90 tablet 3     No current facility-administered medications for this visit.     ALLERGIES:  No Known Allergies  Past Surgical History:   Procedure Laterality Date   • Orif hip fracture Left 12/31/2023   • Tubal ligation       Social History     Tobacco Use   Smoking Status Never   Smokeless Tobacco Never     Social History     Substance and Sexual Activity   Alcohol Use Not Currently     Social History     Substance and Sexual Activity   Drug Use Not Currently     Family History   Problem Relation Age of Onset   • Hypertension Mother    • Patient is unaware of any medical problems Father        Visit Vitals  /78   Pulse 78   Temp 96.7 °F (35.9 °C) (Temporal)   Resp 16   SpO2 97%         Pain Assessment  Comfort/Function Pain Goal 0    Functional Assessment   Baseline functional status: Walker  Palliative Performance Scale: 40% (Ambulation: mainly in bed, Activity: cannot do any work, Self-Care: mainly assistive, Intake: normal/reduced, LOC: full, drowsy, or confused)  Current Functional Status: Modified independent and Walker  Appetite: Normal       Physical Exam  Constitutional:       General: She is not in acute distress.     Appearance: She is  normal weight. She is ill-appearing. She is not toxic-appearing or diaphoretic.   HENT:      Head: Normocephalic and atraumatic.      Right Ear: External ear normal.      Left Ear: External ear normal.      Nose: Nose normal. No congestion.      Mouth/Throat:      Mouth: Mucous membranes are moist.      Pharynx: No oropharyngeal exudate.   Eyes:      General: Lids are normal. Vision grossly intact.         Right eye: Discharge present.      Pupils: Pupils are equal, round, and reactive to light.        Comments: Redness with a small white patch to right eye. Grand daughter reports it has been going on for over a week   Neck:      Vascular: No carotid bruit.   Cardiovascular:      Rate and Rhythm: Normal rate and regular rhythm.      Pulses: Normal pulses.      Heart sounds: Normal heart sounds. No murmur heard.  Pulmonary:      Effort: Pulmonary effort is normal. No respiratory distress.      Breath sounds: Normal breath sounds. No stridor. No wheezing, rhonchi or rales.   Chest:      Chest wall: No tenderness.   Abdominal:      General: Bowel sounds are normal. There is no distension.      Palpations: Abdomen is soft.      Tenderness: There is no abdominal tenderness. There is no right CVA tenderness, left CVA tenderness or guarding.   Musculoskeletal:         General: No swelling, tenderness, deformity or signs of injury.      Cervical back: Normal range of motion and neck supple. No rigidity or tenderness.      Right lower leg: No edema.      Left lower leg: No edema.      Comments: Limited ROM all ext   Lymphadenopathy:      Cervical: No cervical adenopathy.   Skin:     General: Skin is warm.      Capillary Refill: Capillary refill takes less than 2 seconds.      Coloration: Skin is not jaundiced or pale.      Findings: No bruising, erythema or rash.   Neurological:      General: No focal deficit present.      Mental Status: She is alert. Mental status is at baseline. She is disoriented.      Cranial Nerves: No  cranial nerve deficit.      Sensory: No sensory deficit.      Motor: Weakness present.      Coordination: Coordination abnormal.      Gait: Gait abnormal.   Psychiatric:         Attention and Perception: Attention normal.         Mood and Affect: Mood and affect normal.         Speech: Speech normal.         Behavior: Behavior normal. Behavior is cooperative.         Thought Content: Thought content normal.         Cognition and Memory: Cognition is impaired. Memory is impaired. She exhibits impaired recent memory. She does not exhibit impaired remote memory.         Judgment: Judgment normal.          Prognosis: fair  Basis for estimate:chart review and clinical assessment      ASSESSMENT/PLAN:     Diagnoses and associated orders for this visit:  1. Benign hypertension with CKD (chronic kidney disease) stage III  (CMD)  Assessment & Plan:  Hypertension is stable.  Continue current treatment regimen.  Dietary sodium restriction.  Continue current medications.  Blood pressure will be reassessed at the next regular appointment continue with Norvasc, metoprolol, hydralazine.  Avoid nephrotoxins . CKD improved  2. PVD (peripheral vascular disease) (CMD)  Assessment & Plan:  No wounds to feet. Podiatrist following  3. Alzheimer's disease  (CMD)  Assessment & Plan:  Psychological condition is unchanged.  Supportive care. Psychological condition will be reassessed at the next regular appointment. Redirect as needed.fall precautions  4. Frail elderly  Assessment & Plan:  Stable. Supportive care. Patient is eating well. Ambulates with 1 assist. Fall precautions  5. Intertrochanteric fx-closed, left, sequela  Assessment & Plan:  Patient is status post ORIF to left hip. She walking with 1 assist ,also uses a walker with assist. Fall precautions             Home Health Face-To-Face     I had a face-to-face encounter that meets the provider face-to-face encounter requirement with this patient on 6/18/2024    The encounter with  the patient was in whole, or part, for the following medical condition, which is the primary reason for home health care (list medical conditions):  1. Benign hypertension with CKD (chronic kidney disease) stage III  (CMD)    2. PVD (peripheral vascular disease) (CMD)    3. Alzheimer's disease  (CMD)    4. Frail elderly    5. Intertrochanteric fx-closed, left, sequela        I certify that, based on my findings, the following services are medically necessary home health services:   Physical Therapy    My clinical findings support the need for the above service because of the need to monitor safety and medication at home and improve functional status.    Further, this patient is homebound because:  Requires aid of supportive device(s), Weakness which limits endurance, Requires supervision/assistance of another person, and Risk for falls outside home environment    The patient is under my care, and a plan of care has been initiated and will periodically be reviewed by a physician.  I conducted a face-to-face encounter with this patient on the above date, during which the primary reason for home health services was addressed.  I have a clinical note (supporting documentation) documenting my encounter with the patient in the patient's medical record to support certification and eligibility for home care.      Total face to face time spent with patient  40 minutes. Of the total face to face time,5  minutes were dedicated to advance care planning      Thank you for involving [facility]. ([facility])   Madeline Gan, CNP

## 2024-09-18 NOTE — OPERATIVE REPORT
COLONOSCOPY REPORT    Winnie Cruz     1960 Age 63 year old   PCP Imtiaz Gupta DO Endoscopist Florentin Mejia MD     Date of procedure: 24    Procedure: Colonoscopy w/submucosal injection + snare polypectomy    Pre-operative diagnosis: Screening, Family hx of CRC    Post-operative diagnosis: Tortuous colon, colon polyps x2, internal hemorrhoids    Medications: MAC    Withdrawal time: 20 minutes    Procedure:  Informed consent was obtained from the patient after the risks of the procedure were discussed, including but not limited to bleeding, perforation, aspiration, infection, or possibility of a missed lesion. After discussions of the risks/benefits and alternatives to this procedure, as well as the planned sedation, the patient was placed in the left lateral decubitus position and begun on continuous blood pressure pulse oximetry and EKG monitoring and this was maintained throughout the procedure. Once an adequate level of sedation was obtained a digital rectal exam was completed. Then the lubricated tip of the [id number] diagnostic video colonoscope was inserted and advanced without difficulty to the cecum using the CO2 insufflation technique. The cecum was identified by localizing the trifold, the appendix and the ileocecal valve. Withdrawal was begun with thorough washing and careful examination of the colonic walls and folds. A routine second examination of the cecum/ascending colon was performed. Photodocumentation was obtained. The bowel prep was good. Views of the colon were good with washing. I then carefully withdrew the instrument from the patient who tolerated the procedure well.     Complications: none.    Findings:   1. Two polyp(s) noted as follows:      A. 7 mm polyp in the ascending colon; flat morphology; cold snare polypectomy and retrieved.      B. 12 mm polyp in the ascending colon; flat morphology; 5ml of Ellevue submucosal injection to lift the polyp, then cold  snare polypectomy and retrieved.    2. Diverticulosis: none.    3. Tortuous colon.    4. The colonic mucosa throughout the colon showed normal vascular pattern, without evidence of angioectasias or inflammation.     5. A retroflexed view of the rectum revealed small internal hemorrhoids.    6. SANTINO: normal rectal tone, no masses palpated.     Impression:   Two polyps removed, the largest was 12mm in size.  Small internal hemorrhoids.  Tortuous colon.    Recommend:  Await pathology. The interval for the next colonoscopy will be determined after reviewing pathology. Of note there is a family hx of CRC. If new signs or symptoms develop, colonoscopy may need to be repeated sooner.   High fiber diet.  Monitor for blood in the stool. If having more than just tinge of blood, call office or go to the ER.  Continue use of MAC + pediatric c-scope.    >>>If tissue was obtained and you have not received your pathology results either by phone or letter within 2 weeks, please call our office at [phone].    Specimens: colon  Blood loss: <1 ml      ----------------------------------------------------------------------------------------------------------------------------------    INTERVAL FOR COLONOSCOPY:   - If there is no family history of colon cancer and no colon polyps identified in an adequately prepped colon - colonoscopy should be repeated in 10 years. Various factors should be considered regarding repeat colonoscopy - including co-morbid conditions, ability to tolerate procedure with advanced age, and desire for repeat testing.   - If no colon polyps were identified and a positive family history of colon cancer - the colonoscopy should be repeated in 5 years.   - If colon polyps were removed, the colonoscopy should be repeated sooner depending on size/type/location of polyp.

## 2024-09-18 NOTE — DISCHARGE INSTRUCTIONS
Home Care Instructions for Colonoscopy with Sedation    Diet:  - Resume your regular diet as tolerated unless otherwise instructed.  - Start with light meals to minimize bloating.  - Do not drink alcohol today.    Medication:  - If you have questions about resuming your normal medications, please contact your Primary Care Physician.    Activities:  - Take it easy today. Do not return to work today.  - Do not drive today.  - Do not operate any machinery today (including kitchen equipment).    Colonoscopy:  - You may notice some rectal \"spotting\" (a little blood on the toilet tissue) for a day or two after the exam. This is normal.  - If you experience any rectal bleeding (not spotting), persistent tenderness or sharp severe abdominal pains, oral temperature over 100 degrees Fahrenheit, light-headedness or dizziness, or any other problems, contact your doctor.    **If unable to reach your doctor, please go to the [facility] Emergency Room**    - Your referring physician will receive a full report of your examination.  - If you do not hear from your doctor's office within two weeks of your biopsy, please call them for your results.    You may be able to see your laboratory results in [employer] between 4 and 7 business days.  In some cases, your physician may not have viewed the results before they are released to [employer].  If you have questions regarding your results contact the physician who ordered the test/exam by phone or via [employer] by choosing \"Ask a Medical Question.\"

## 2024-09-18 NOTE — ANESTHESIA PREPROCEDURE EVALUATION
Anesthesia PreOp Note    HPI:     Winnie Cruz is a 63 year old female who presents for preoperative consultation requested by: JESSE Mejia MD    Date of Surgery: 9/18/2024    Procedure(s):  COLONOSCOPY  Indication: Colon cancer screening / FH: colon cancer    Relevant Problems   No relevant active problems       NPO:  Last Liquid Consumption Date: 09/18/24  Last Liquid Consumption Time: 0915  Last Solid Consumption Date: 09/17/24  Last Solid Consumption Time: 0800  Last Liquid Consumption Date: 09/18/24          History Review:  Patient Active Problem List    Diagnosis Date Noted    Chronic idiopathic constipation 05/16/2024    Dense breasts 03/05/2024    Basal cell carcinoma (BCC) of skin of left upper lip 03/31/2023    Trigger finger of all digits of right hand 05/02/2022    Carpal tunnel syndrome of right wrist 03/30/2022    Stress incontinence 03/30/2022    Chronic bilateral low back pain without sciatica 03/30/2022    Vegetarian 12/14/2021    Tinnitus of left ear 12/14/2021    Trigger ring finger of right hand 12/14/2021    Family history of colon cancer in father 06/01/2021    DDD (degenerative disc disease), cervical 08/29/2014    Cervical myofascial pain syndrome 05/02/2013       Past Medical History:    Cancer (HCC)    basal call on upper lip, and again 2022 per pt    Cervical myofascial pain syndrome    Chronic pain    right shoulder    COVID-19    cough, fever, body aches    DDD (degenerative disc disease), cervical    Hearing impairment    tinnitus    Osteoarthritis    OTHER DISEASES    shoulder pain    Visual impairment    contacts/glasses       Past Surgical History:   Procedure Laterality Date    Biopsy of skin lesion  2012    BCC    Colonoscopy,diagnostic  07/25/2011    Performed by SUSHMA BRAVO at [facility], [facility]    Lipoma removal Left 2012    Left Upper Arm    Oral surgery  1984    [facility]       Medications Prior to Admission   Medication Sig Dispense Refill Last Dose     pregabalin 75 MG Oral Cap Take 1 capsule (75 mg total) by mouth daily. 90 capsule 0 9/17/2024    cholecalciferol 25 MCG (1000 UT) Oral Cap Take 1 capsule (1,000 Units total) by mouth daily.   9/11/2024    Multiple Vitamins-Minerals (CENTRUM) Oral Tab Take 1 tablet by mouth daily.   9/11/2024    FLUoxetine 10 MG Oral Cap Take 1 capsule (10 mg total) by mouth daily. (Patient not taking: Reported on 8/28/2024) 90 capsule 0      Current Facility-Administered Medications Ordered in Epic   Medication Dose Route Frequency Provider Last Rate Last Admin    lactated ringers infusion   Intravenous Continuous JESSE Mejia MD         No current [facility]-ordered outpatient medications on file.       Allergies   Allergen Reactions    Cipro Xr [Ciprofloxacin-Cipro Hcl- Cipro Betaine] RASH     Cipro XR       Family History   Problem Relation Age of Onset    Other (alzheimers) Mother     Cancer Father 67        colon cancer    Prostate Cancer Father     Pacemaker Father     Stroke Father     Other (IBS) Sister     Osteoporosis Sister     Stroke Brother     Other (aphasia) Brother     Cancer Maternal Grandmother         uterine cancer     Social History     Socioeconomic History    Marital status: Single   Tobacco Use    Smoking status: Never    Smokeless tobacco: Never   Vaping Use    Vaping status: Never Used   Substance and Sexual Activity    Alcohol use: Never    Drug use: Never   Other Topics Concern    Caffeine Concern Yes     Comment: daily coffee    Exercise Yes     Comment: running 3/4 weekly.     History of tanning Yes    Reaction to local anesthetic No    Pt has a pacemaker No    Pt has a defibrillator No       Available pre-op labs reviewed.             Vital Signs:  Body mass index is 19.91 kg/m².   height is 1.6 m (5' 3\") and weight is 51 kg (112 lb 6.4 oz). Her blood pressure is 140/75 and her pulse is 77. Her respiration is 12 and oxygen saturation is 100%.   Vitals:    09/14/24 1124 09/18/24 1208   BP:  140/75    Pulse:  77   Resp:  12   SpO2:  100%   Weight: 52.2 kg (115 lb) 51 kg (112 lb 6.4 oz)   Height: 1.613 m (5' 3.5\") 1.6 m (5' 3\")        Anesthesia Evaluation     Patient summary reviewed and Nursing notes reviewed    Airway   Mallampati: I  TM distance: >3 FB  Neck ROM: full  Dental - Dentition appears grossly intact     Pulmonary - negative ROS and normal exam   Cardiovascular - negative ROS and normal exam    Neuro/Psych    (+)  neuromuscular disease,        GI/Hepatic/Renal - negative ROS     Endo/Other - negative ROS   Abdominal  - normal exam                 Anesthesia Plan:   ASA:  1  Plan:   MAC  Informed Consent Plan and Risks Discussed With:  Patient  Discussed plan with:  Surgeon      I have informed Winnie Cruz and/or legal guardian or family member of the nature of the anesthetic plan, benefits, risks including possible dental damage if relevant, major complications, and any alternative forms of anesthetic management.   All of the patient's questions were answered to the best of my ability. The patient desires the anesthetic management as planned.  SEKOU GIMENEZ MD  9/18/2024 12:27 PM  Present on Admission:  **None**

## 2024-09-18 NOTE — ANESTHESIA POSTPROCEDURE EVALUATION
Patient: Winnie Cruz    Procedure Summary       Date: 09/18/24 Room / Location: [facility] ENDOSCOPY 02 / [facility] ENDO    Anesthesia Start: 1236 Anesthesia Stop:     Procedure: COLONOSCOPY with polypectomy and eleview lift Diagnosis:       Colon cancer screening      FH: colon cancer      (Polyps hemorrhoids torturous colon)    Surgeons: JESSE Mejia MD Anesthesiologist: Elizabeth Hector MD    Anesthesia Type: MAC ASA Status: 1            Anesthesia Type: MAC    Vitals Value Taken Time   /64 09/18/24 1316   Temp 97.4 °F (36.3 °C) 09/18/24 1314   Pulse 67 09/18/24 1317   Resp 18 09/18/24 1317   SpO2 100 % 09/18/24 1317   Vitals shown include unfiled device data.    [facility] AN Post Evaluation:   Patient Evaluated in PACU  Patient Participation: complete - patient participated  Level of Consciousness: awake  Pain Management: adequate  Airway Patency:patent  Dental exam unchanged from preop  Yes    Cardiovascular Status: acceptable  Respiratory Status: acceptable  Postoperative Hydration acceptable      ELIZABETH HECTOR MD  9/18/2024 1:17 PM

## 2024-09-25 ENCOUNTER — TELEPHONE (OUTPATIENT)
Facility: CLINIC | Age: 64
End: 2024-09-25

## 2024-09-25 NOTE — TELEPHONE ENCOUNTER
Left Vm for patient explaining polyps. Due to size, needs repeat in 3 years.    GI staff: please place recall for colonoscopy in 3 years

## 2024-09-25 NOTE — TELEPHONE ENCOUNTER
Health maintenance updated. Last colonoscopy done 9/18/24. 3 year recall placed into Pt Outreach, next due on 09/2027 per Dr. Mejia.

## 2024-10-08 ENCOUNTER — OFFICE VISIT (OUTPATIENT)
Facility: CLINIC | Age: 64
End: 2024-10-08
Payer: MEDICARE

## 2024-10-08 VITALS
HEIGHT: 63 IN | OXYGEN SATURATION: 98 % | DIASTOLIC BLOOD PRESSURE: 72 MMHG | SYSTOLIC BLOOD PRESSURE: 124 MMHG | BODY MASS INDEX: 20 KG/M2 | HEART RATE: 79 BPM

## 2024-10-08 DIAGNOSIS — F41.9 ANXIETY AND DEPRESSION: ICD-10-CM

## 2024-10-08 DIAGNOSIS — M25.551 RIGHT HIP PAIN: Primary | ICD-10-CM

## 2024-10-08 DIAGNOSIS — F32.A ANXIETY AND DEPRESSION: ICD-10-CM

## 2024-10-08 PROCEDURE — 99214 OFFICE O/P EST MOD 30 MIN: CPT | Performed by: FAMILY MEDICINE

## 2024-10-08 NOTE — PROGRESS NOTES
HPI:    Patient ID: Winnie Cruz is a 63 year old female who presents for f/u.    HPI  Has acute on chronic right hip pain.   Had been running more (every other day for 3 miles) prior to recent flare.   Feels in anterior right hip.   No associated symptoms.     Unsure if she wants to start prozac. She is still nervous about side effects.   Spoke to [facility] staff and recommended PHP/IOP. She declined at the time.     Past Medical History:    Cancer (HCC)    basal call on upper lip, and again 2022 per pt    Cervical myofascial pain syndrome    Chronic pain    right shoulder    Colon polyps    repeat CLN in 2027, + family hx of CRC    COVID-19    cough, fever, body aches    DDD (degenerative disc disease), cervical    Hearing impairment    tinnitus    Osteoarthritis    OTHER DISEASES    shoulder pain    Visual impairment    contacts/glasses        Current Outpatient Medications   Medication Sig Dispense Refill    pregabalin 75 MG Oral Cap Take 1 capsule (75 mg total) by mouth daily. 90 capsule 0    cholecalciferol 25 MCG (1000 UT) Oral Cap Take 1 capsule (1,000 Units total) by mouth daily.      Multiple Vitamins-Minerals (CENTRUM) Oral Tab Take 1 tablet by mouth daily.      FLUoxetine 10 MG Oral Cap Take 1 capsule (10 mg total) by mouth daily. (Patient not taking: Reported on 8/28/2024) 90 capsule 0        Allergies   Allergen Reactions    Cipro Xr [Ciprofloxacin-Cipro Hcl- Cipro Betaine] RASH     Cipro XR       Review of Systems   Musculoskeletal:  Positive for arthralgias.   Psychiatric/Behavioral:          See HPI   All other systems reviewed and are negative.           /72   Pulse 79   Ht 5' 3\" (1.6 m)   SpO2 98%   BMI 19.91 kg/m²     PHYSICAL EXAM:   Physical Exam  Constitutional:       General: She is not in acute distress.     Appearance: Normal appearance. She is well-developed. She is not ill-appearing, toxic-appearing or diaphoretic.   HENT:      Head: Normocephalic and atraumatic.   Eyes:       Extraocular Movements: Extraocular movements intact.      Conjunctiva/sclera: Conjunctivae normal.   Cardiovascular:      Rate and Rhythm: Normal rate and regular rhythm.      Heart sounds: Normal heart sounds. No murmur heard.     No friction rub. No gallop.   Pulmonary:      Effort: Pulmonary effort is normal. No respiratory distress.      Breath sounds: Normal breath sounds. No wheezing or rales.   Musculoskeletal:      Right hip: Tenderness (mild anterior hip joint ttp) present. No deformity, lacerations, bony tenderness or crepitus. Normal range of motion. Normal strength.      Right lower leg: No edema.      Left lower leg: No edema.      Comments: Right hip: Negative leg roll. Positive JAN, Negative FADIR.    Skin:     General: Skin is warm and dry.      Capillary Refill: Capillary refill takes less than 2 seconds.   Neurological:      General: No focal deficit present.      Mental Status: She is alert.   Psychiatric:         Mood and Affect: Mood normal.         Behavior: Behavior normal.         Thought Content: Thought content normal.         Judgment: Judgment normal.             ASSESSMENT/PLAN:     Encounter Diagnoses   Name Primary?    Right hip pain Yes    Anxiety and depression        1. Right hip pain  -Ddx includes hip flexor strain.  -Reviewed conservative management in detail including heat & NSAIDs prn.   -Discussed activity modification.   -Given HEP. Plan for formal PT if persists/worsens.     2. Anxiety and depression  -Again discussed in detail as well as SE of prozac. Still recommend starting prozac and CBT. She will consider.     Meds This Visit:  Requested Prescriptions      No prescriptions requested or ordered in this encounter       Imaging & Referrals:  None    A total of 36 minutes were spent with patient and reviewing medical records pertaining to this visit.        Imtiaz Gupta, DO  ID#[id number]

## 2024-11-15 DIAGNOSIS — G89.4 CHRONIC PAIN SYNDROME: ICD-10-CM

## 2024-11-18 RX ORDER — PREGABALIN 75 MG/1
75 CAPSULE ORAL DAILY
Qty: 90 CAPSULE | Refills: 0 | Status: SHIPPED | OUTPATIENT
Start: 2024-11-18

## 2024-11-18 NOTE — TELEPHONE ENCOUNTER
First Call Attempt.   Left detailed message that a requested Prescription was sent to [place] pharmacy as requested-  okay per verbal release

## 2024-11-18 NOTE — TELEPHONE ENCOUNTER
Dr Gupta --- please advise on Pregabalin. She is OUT of medicaiton.     Patient called office. Date of birth and full name both confirmed.       She is OUT of medication. Needs to  medication today.     Rn provided education that it's okay  and recommended to request medications, even controlled substances, from our office in advance to allow time for processing. To Avoid missed doses.   She verbalizes understanding of all information, and agreeable to plan for future refills.     Please Review. Protocol Failed or has no protocol.       Requested Prescriptions   Pending Prescriptions Disp Refills    PREGABALIN 75 MG Oral Cap [Pharmacy Med Name: Pregabalin 75 Mg Cap Nova] 90 capsule 0     Sig: Take 1 capsule (75 mg total) by mouth daily.       Controlled Substance Medication Failed - 11/18/2024  8:36 AM        Failed - This medication is a controlled substance - forward to provider to refill       Neurology Medications Passed - 11/18/2024  8:36 AM        Passed - In person appointment or virtual visit in the past 6 mos or appointment in next 3 mos     Recent Outpatient Visits              1 month ago Right hip pain    [facility] Imtiaz Gupta DO    Office Visit    2 months ago Multiple insect bites    [facility], [facility], María Crooks APN    Office Visit    3 months ago Anxiety and depression    [facility] Imtiaz Gupta DO    Office Visit    6 months ago [facility]    [facility] Visit    8 months ago SK (seborrheic keratosis)    [facility], Dominique Chirinos MD    Office Visit                         Recent Outpatient Visits              1 month ago Right hip pain    [facility] Imtiaz Gupta      Office Visit    2 months ago Multiple insect bites    [facility], [facility], María Crooks APN    Office Visit    3 months ago Anxiety and depression    [facility], [facility] Imtiaz Gupta DO    Office Visit    6 months ago Chronic idiopathic constipation    [facility], [facility], JESSE Medellin MD    Office Visit    8 months ago SK (seborrheic keratosis)    [facility], Dominique Chirinos MD    Office Visit

## 2024-12-23 ENCOUNTER — NURSE TRIAGE (OUTPATIENT)
Facility: CLINIC | Age: 64
End: 2024-12-23

## 2024-12-23 ENCOUNTER — OFFICE VISIT (OUTPATIENT)
Facility: CLINIC | Age: 64
End: 2024-12-23
Payer: MEDICARE

## 2024-12-23 VITALS
BODY MASS INDEX: 20 KG/M2 | OXYGEN SATURATION: 99 % | HEIGHT: 63 IN | SYSTOLIC BLOOD PRESSURE: 102 MMHG | HEART RATE: 66 BPM | DIASTOLIC BLOOD PRESSURE: 60 MMHG

## 2024-12-23 DIAGNOSIS — B02.9 HERPES ZOSTER WITHOUT COMPLICATION: Primary | ICD-10-CM

## 2024-12-23 PROCEDURE — 99213 OFFICE O/P EST LOW 20 MIN: CPT | Performed by: FAMILY MEDICINE

## 2024-12-23 RX ORDER — VALACYCLOVIR HYDROCHLORIDE 1 G/1
1000 TABLET, FILM COATED ORAL 3 TIMES DAILY
Qty: 21 TABLET | Refills: 0 | Status: SHIPPED | OUTPATIENT
Start: 2024-12-23 | End: 2024-12-30

## 2024-12-23 NOTE — TELEPHONE ENCOUNTER
Noted:  Future Appointments   Date Time Provider Department Center   12/23/2024  4:00 PM Imtiaz Gupta,  [facility]

## 2024-12-23 NOTE — TELEPHONE ENCOUNTER
Action Requested: Summary for Provider     []  Critical Lab, Recommendations Needed  [x] Need Additional Advice  []   FYI    []   Need Orders  [] Need Medications Sent to Pharmacy  []  Other     Dr Gupta, please advise  Patient is asking if you can see her today for possible shingles/rash to face above right eye?    SUMMARY: Per Protocol disposition advised to be seen in the office.      Reason for call: Rash  Onset:     Patient (name and  verified) calling with rash to the right side of her face above right eye/forehead for . Patient c/o mild itching, and states that it is painful when touched. Patient states that she started with eye pain but now that is better.   Reason for Disposition   Painful rash with multiple small blisters grouped together (i.e., dermatomal distribution or 'band' or 'stripe')    Protocols used: Rash or Redness - [id number]

## 2024-12-23 NOTE — PROGRESS NOTES
HPI:    Patient ID: Winnie Cruz is a 64 year old female who presents for skin rash.    HPI  First noticed rash on right side of forehead on Saturday.  No topical exposures.   Intermittently painful.   Little itching.   No rashes elsewhere.   Had cold symptoms last week Thursday, which includes right eye pain.   Right eye pain has improved since then.   No vision changes. No tearing.   No other associated symptoms.   No prior shingles infection.   No prior shingles vaccine.     Past Medical History:    Cancer (HCC)    basal call on upper lip, and again 2022 per pt    Cervical myofascial pain syndrome    Chronic pain    right shoulder    Colon polyps    repeat CLN in 2027, + family hx of CRC    COVID-19    cough, fever, body aches    DDD (degenerative disc disease), cervical    Hearing impairment    tinnitus    Osteoarthritis    OTHER DISEASES    shoulder pain    Visual impairment    contacts/glasses        Current Outpatient Medications   Medication Sig Dispense Refill    valACYclovir 1 G Oral Tab Take 1 tablet (1,000 mg total) by mouth in the morning, at noon, and at bedtime for 7 days. 21 tablet 0    PREGABALIN 75 MG Oral Cap Take 1 capsule (75 mg total) by mouth daily. 90 capsule 0    cholecalciferol 25 MCG (1000 UT) Oral Cap Take 1 capsule (1,000 Units total) by mouth daily.      Multiple Vitamins-Minerals (CENTRUM) Oral Tab Take 1 tablet by mouth daily.      FLUoxetine 10 MG Oral Cap Take 1 capsule (10 mg total) by mouth daily. (Patient not taking: Reported on 12/23/2024) 90 capsule 0        Allergies[1]    Review of Systems   See HPI. Otherwise negative.         /60   Pulse 66   Ht 5' 3\" (1.6 m)   SpO2 99%   BMI 19.91 kg/m²     PHYSICAL EXAM:   Physical Exam  Constitutional:       General: She is not in acute distress.     Appearance: Normal appearance. She is well-developed. She is not ill-appearing, toxic-appearing or diaphoretic.   HENT:      Head: Normocephalic and atraumatic.      Right Ear:  Tympanic membrane, ear canal and external ear normal.      Left Ear: Tympanic membrane, ear canal and external ear normal.      Nose: Nose normal.      Mouth/Throat:      Mouth: Mucous membranes are moist.      Pharynx: Oropharynx is clear.   Eyes:      Extraocular Movements: Extraocular movements intact.      Conjunctiva/sclera: Conjunctivae normal.   Cardiovascular:      Rate and Rhythm: Normal rate and regular rhythm.      Pulses: Normal pulses.      Heart sounds: Normal heart sounds. No murmur heard.     No friction rub. No gallop.   Pulmonary:      Effort: Pulmonary effort is normal. No respiratory distress.      Breath sounds: Normal breath sounds. No wheezing or rales.   Musculoskeletal:      Cervical back: Neck supple.   Lymphadenopathy:      Cervical: No cervical adenopathy.   Skin:     General: Skin is warm and dry.      Capillary Refill: Capillary refill takes less than 2 seconds.      Findings: Rash (round red macule on right upper lateral forehead. few red papules near midline at hairline. no vesicles.) present.   Neurological:      General: No focal deficit present.      Mental Status: She is alert.   Psychiatric:         Mood and Affect: Mood normal.         Behavior: Behavior normal.         Thought Content: Thought content normal.         Judgment: Judgment normal.             ASSESSMENT/PLAN:     Encounter Diagnosis   Name Primary?    Herpes zoster without complication Yes       1. Herpes zoster without complication     -Suspect Shingles.  -Start valtrex TID x7 days.   -Urged her to call if rash is spreading closer to eye, and will have low threshold to refer to ophtho for evaluation.     Meds This Visit:  Requested Prescriptions     Signed Prescriptions Disp Refills    valACYclovir 1 G Oral Tab 21 tablet 0     Sig: Take 1 tablet (1,000 mg total) by mouth in the morning, at noon, and at bedtime for 7 days.       Imaging & Referrals:  None       Imtiaz Gupta,   ID#[id number]       [1]    Allergies  Allergen Reactions    Cipro Xr [Ciprofloxacin-Cipro Hcl- Cipro Betaine] RASH     Cipro XR

## 2024-12-27 ENCOUNTER — TELEPHONE (OUTPATIENT)
Facility: CLINIC | Age: 64
End: 2024-12-27

## 2024-12-27 NOTE — TELEPHONE ENCOUNTER
Very unlikely related to valtrex. Recommend heat and NSAIDs prn. Pregabalin as prescribed as she did not tolerate higher dose. Thank you.

## 2024-12-27 NOTE — TELEPHONE ENCOUNTER
Spoke with patient, Date of Birth verified  She is taking valacyclovir for shingles and it's helping.   Patient takes pregabalin for chronic neck and shoulder pain. Today she woke up with neck and upper trap pain, she is not sure if her symptom is related to valacyclovir?   Her pain rate 6/10, more on discomfort and tightness but it's more than usual. She tried ice/ warm compress.   She denies chest pain, shortness of breath, headache, nausea/ vomiting, no other symptom.   She  was advised if sx persist or gets worse to go to ER/ IC, she agreed and stated understanding.   pls advise, thanks in advance.

## 2024-12-27 NOTE — TELEPHONE ENCOUNTER
Name and  verified, patient returning call. Reviewed Dr. Gupta's note with her. Patient inquiring about if it is safe to go out or be around her family with her niece being pregnant. Informed the patient as long as it is scabbed missy, it is not leaking or oozing or on multiple places on her body she is ok to be around people as long as she avoids physical contact and washes her hands frequently.

## 2025-02-13 DIAGNOSIS — G89.4 CHRONIC PAIN SYNDROME: ICD-10-CM

## 2025-02-14 RX ORDER — PREGABALIN 75 MG/1
75 CAPSULE ORAL DAILY
Qty: 90 CAPSULE | Refills: 0 | Status: SHIPPED | OUTPATIENT
Start: 2025-02-14

## 2025-02-14 NOTE — TELEPHONE ENCOUNTER
Please review. Protocol Failed; No Protocol      Recent fills: 11/18/2024  Last Rx written: 11/18/2024  Last office visit: 12/23/2024      Future Appointments  Date Type Provider Dept   03/13/25 Appointment Imtiaz Gupta DO [facility] Op   Showing future appointments within next 150 days with a meds authorizing provider and meeting all other requirements        Requested Prescriptions   Pending Prescriptions Disp Refills    PREGABALIN 75 MG Oral Cap [Pharmacy Med Name: Pregabalin 75 Mg Cap Nova] 90 capsule 0     Sig: TAKE ONE CAPSULE BY MOUTH ONE TIME DAILY       Controlled Substance Medication Failed - 2/14/2025  9:36 AM        Failed - This medication is a controlled substance - forward to provider to refill        Passed - Medication is active on med list       Neurology Medications Passed - 2/14/2025  9:36 AM        Passed - In person appointment or virtual visit in the past 6 mos or appointment in next 3 mos     Recent Outpatient Visits              1 month ago Herpes zoster without complication    [facility] Imtiaz Gupta DO    Office Visit    4 months ago Right hip pain    [facility] Imtiaz Gupta DO    Office Visit    5 months ago Multiple insect bites    [facility], [facility], María Crooks AP[facility]    Office Visit    6 months ago Anxiety and depression    [facility] Imtiaz Gupta DO    Office Visit    9 months ago [facility]    [facility]          Future Appointments         Provider Department Appt Notes    In 3 weeks Imtiaz Gupta DO [facility], [facility]                     Passed - Medication is active on med list               Future Appointments         Provider Department Appt Notes    In 3  Imtiaz Dorantes DO [facility], [facility]           Recent Outpatient Visits              1 month ago Herpes zoster without complication    [facility], [facility] Imtiaz Gupta,     Office Visit    4 months ago Right hip pain    [facility], [facility] Imtiaz Gupta,     Office Visit    5 months ago Multiple insect bites    [facility], [facility], María Crooks AP[facility]    Office Visit    6 months ago Anxiety and depression    [facility], [facility] Imtiaz Gupta,     Office Visit    9 months ago Chronic idiopathic constipation    [facility], JESSE Medellin MD    Office Visit

## 2025-02-14 NOTE — TELEPHONE ENCOUNTER
Patient called states she will be out of Rx Sunday; she has 1 pill left. I made her aware I will request as URGENT. She would like the 90-day as prescribed previously. Patient verbalized understanding. No further questions or concerns at this time.    Routing as URGENT

## 2025-03-03 ENCOUNTER — NURSE TRIAGE (OUTPATIENT)
Facility: CLINIC | Age: 65
End: 2025-03-03

## 2025-03-03 NOTE — TELEPHONE ENCOUNTER
Action Requested: Summary for Provider     []  Critical Lab, Recommendations Needed  [] Need Additional Advice  []   FYI    []   Need Orders  [] Need Medications Sent to Pharmacy  []  Other     SUMMARY: Complaining of bilateral knee pain. Offered appointment but patient wishes to give it 24 hrs and try some home care first.     Reason for call: Acute and Knee Pain  Onset: Last night    Reports pain in knees bilaterally that has occurred since last night. States knees feel stiff. It is difficult to put weight on legs. Patient able to walk and move around. States pain is about a 5 and it comes and goes. Notes pain is more in left than right. Denies swelling, redness, calf pain and not hot to touch. Patient offered appointment but would like to wait and try home care. Agreed to call back if symptoms change or worsen.                         Reason for Disposition   Patient wants to be seen    Protocols used: Knee Pain-A-[place]

## 2025-03-13 ENCOUNTER — OFFICE VISIT (OUTPATIENT)
Facility: CLINIC | Age: 65
End: 2025-03-13
Payer: MEDICARE

## 2025-03-13 ENCOUNTER — LAB ENCOUNTER (OUTPATIENT)
Dept: LAB | Facility: REFERENCE LAB | Age: 65
End: 2025-03-13
Attending: FAMILY MEDICINE
Payer: MEDICARE

## 2025-03-13 VITALS
DIASTOLIC BLOOD PRESSURE: 62 MMHG | WEIGHT: 117 LBS | SYSTOLIC BLOOD PRESSURE: 118 MMHG | HEIGHT: 63 IN | BODY MASS INDEX: 20.73 KG/M2 | HEART RATE: 78 BPM | OXYGEN SATURATION: 98 %

## 2025-03-13 DIAGNOSIS — L98.9 SKIN LESION: ICD-10-CM

## 2025-03-13 DIAGNOSIS — Z00.00 ENCOUNTER FOR ANNUAL HEALTH EXAMINATION: ICD-10-CM

## 2025-03-13 DIAGNOSIS — Z78.9 VEGETARIAN DIET: ICD-10-CM

## 2025-03-13 DIAGNOSIS — F41.9 ANXIETY AND DEPRESSION: ICD-10-CM

## 2025-03-13 DIAGNOSIS — R73.03 PREDIABETES: ICD-10-CM

## 2025-03-13 DIAGNOSIS — E55.9 VITAMIN D DEFICIENCY: ICD-10-CM

## 2025-03-13 DIAGNOSIS — M25.551 CHRONIC RIGHT HIP PAIN: ICD-10-CM

## 2025-03-13 DIAGNOSIS — R92.343 EXTREMELY DENSE TISSUE OF BOTH BREASTS ON MAMMOGRAPHY: ICD-10-CM

## 2025-03-13 DIAGNOSIS — D64.9 NORMOCYTIC ANEMIA: ICD-10-CM

## 2025-03-13 DIAGNOSIS — Z00.00 ENCOUNTER FOR ANNUAL HEALTH EXAMINATION: Primary | ICD-10-CM

## 2025-03-13 DIAGNOSIS — G89.29 CHRONIC RIGHT HIP PAIN: ICD-10-CM

## 2025-03-13 DIAGNOSIS — F32.A ANXIETY AND DEPRESSION: ICD-10-CM

## 2025-03-13 DIAGNOSIS — Z00.00 ANNUAL PHYSICAL EXAM: ICD-10-CM

## 2025-03-13 DIAGNOSIS — G89.4 CHRONIC PAIN SYNDROME: ICD-10-CM

## 2025-03-13 DIAGNOSIS — Z12.31 ENCOUNTER FOR SCREENING MAMMOGRAM FOR MALIGNANT NEOPLASM OF BREAST: ICD-10-CM

## 2025-03-13 DIAGNOSIS — Z78.0 POSTMENOPAUSAL: ICD-10-CM

## 2025-03-13 LAB
ALBUMIN SERPL-MCNC: 4.9 G/DL (ref 3.2–4.8)
ALBUMIN/GLOB SERPL: 2 {RATIO} (ref 1–2)
ALP LIVER SERPL-CCNC: 101 U/L
ALT SERPL-CCNC: 15 U/L
ANION GAP SERPL CALC-SCNC: 6 MMOL/L (ref 0–18)
AST SERPL-CCNC: 25 U/L (ref ?–34)
BASOPHILS # BLD AUTO: 0.04 X10(3) UL (ref 0–0.2)
BASOPHILS NFR BLD AUTO: 0.6 %
BILIRUB SERPL-MCNC: 0.4 MG/DL (ref 0.2–1.1)
BUN BLD-MCNC: 15 MG/DL (ref 9–23)
BUN/CREAT SERPL: 19.7 (ref 10–20)
CALCIUM BLD-MCNC: 9.1 MG/DL (ref 8.7–10.4)
CHLORIDE SERPL-SCNC: 105 MMOL/L (ref 98–112)
CHOLEST SERPL-MCNC: 174 MG/DL (ref ?–200)
CO2 SERPL-SCNC: 30 MMOL/L (ref 21–32)
CREAT BLD-MCNC: 0.76 MG/DL
DEPRECATED HBV CORE AB SER IA-ACNC: 44 NG/ML
DEPRECATED RDW RBC AUTO: 40.2 FL (ref 35.1–46.3)
EGFRCR SERPLBLD CKD-EPI 2021: 86 ML/MIN/1.73M2 (ref 60–?)
EOSINOPHIL # BLD AUTO: 0.07 X10(3) UL (ref 0–0.7)
EOSINOPHIL NFR BLD AUTO: 1.1 %
ERYTHROCYTE [DISTWIDTH] IN BLOOD BY AUTOMATED COUNT: 12.1 % (ref 11–15)
EST. AVERAGE GLUCOSE BLD GHB EST-MCNC: 123 MG/DL (ref 68–126)
FASTING PATIENT LIPID ANSWER: NO
FASTING STATUS PATIENT QL REPORTED: NO
GLOBULIN PLAS-MCNC: 2.5 G/DL (ref 2–3.5)
GLUCOSE BLD-MCNC: 92 MG/DL (ref 70–99)
HBA1C MFR BLD: 5.9 % (ref ?–5.7)
HCT VFR BLD AUTO: 35.7 %
HDLC SERPL-MCNC: 61 MG/DL (ref 40–59)
HGB BLD-MCNC: 12.3 G/DL
IMM GRANULOCYTES # BLD AUTO: 0.01 X10(3) UL (ref 0–1)
IMM GRANULOCYTES NFR BLD: 0.2 %
IRON SATN MFR SERPL: 18 %
IRON SERPL-MCNC: 50 UG/DL
LDLC SERPL CALC-MCNC: 97 MG/DL (ref ?–100)
LYMPHOCYTES # BLD AUTO: 1.72 X10(3) UL (ref 1–4)
LYMPHOCYTES NFR BLD AUTO: 26.5 %
MCH RBC QN AUTO: 31.3 PG (ref 26–34)
MCHC RBC AUTO-ENTMCNC: 34.5 G/DL (ref 31–37)
MCV RBC AUTO: 90.8 FL
MONOCYTES # BLD AUTO: 0.6 X10(3) UL (ref 0.1–1)
MONOCYTES NFR BLD AUTO: 9.3 %
NEUTROPHILS # BLD AUTO: 4.04 X10 (3) UL (ref 1.5–7.7)
NEUTROPHILS # BLD AUTO: 4.04 X10(3) UL (ref 1.5–7.7)
NEUTROPHILS NFR BLD AUTO: 62.3 %
NONHDLC SERPL-MCNC: 113 MG/DL (ref ?–130)
OSMOLALITY SERPL CALC.SUM OF ELEC: 292 MOSM/KG (ref 275–295)
PLATELET # BLD AUTO: 265 10(3)UL (ref 150–450)
POTASSIUM SERPL-SCNC: 3.9 MMOL/L (ref 3.5–5.1)
PROT SERPL-MCNC: 7.4 G/DL (ref 5.7–8.2)
RBC # BLD AUTO: 3.93 X10(6)UL
SODIUM SERPL-SCNC: 141 MMOL/L (ref 136–145)
TOTAL IRON BINDING CAPACITY: 278 UG/DL (ref 250–425)
TRANSFERRIN SERPL-MCNC: 211 MG/DL (ref 250–380)
TRIGL SERPL-MCNC: 90 MG/DL (ref 30–149)
VIT D+METAB SERPL-MCNC: 37.7 NG/ML (ref 30–100)
VLDLC SERPL CALC-MCNC: 15 MG/DL (ref 0–30)
WBC # BLD AUTO: 6.5 X10(3) UL (ref 4–11)

## 2025-03-13 PROCEDURE — 84466 ASSAY OF TRANSFERRIN: CPT

## 2025-03-13 PROCEDURE — 82306 VITAMIN D 25 HYDROXY: CPT

## 2025-03-13 PROCEDURE — 36415 COLL VENOUS BLD VENIPUNCTURE: CPT

## 2025-03-13 PROCEDURE — 85025 COMPLETE CBC W/AUTO DIFF WBC: CPT

## 2025-03-13 PROCEDURE — 80053 COMPREHEN METABOLIC PANEL: CPT

## 2025-03-13 PROCEDURE — 84443 ASSAY THYROID STIM HORMONE: CPT

## 2025-03-13 PROCEDURE — 82728 ASSAY OF FERRITIN: CPT

## 2025-03-13 PROCEDURE — 83036 HEMOGLOBIN GLYCOSYLATED A1C: CPT

## 2025-03-13 PROCEDURE — 83540 ASSAY OF IRON: CPT

## 2025-03-13 PROCEDURE — 80061 LIPID PANEL: CPT

## 2025-03-13 NOTE — PROGRESS NOTES
Subjective:   Winnie Cruz is a 64 year old female who presents for a Medicare Subsequent Annual Wellness visit (Pt already had Initial Annual Wellness) and scheduled follow up of multiple significant but stable problems.     Felt dizzy and legs felt heavy a couple weeks ago. Drank some water and went to bed and felt better. Knees were still sore afterwards though. Still sore today, and worse with stairs.     Has felt more low energy in the last 2 weeks as well.     Still vegetarian. Would like to check iron levels.     Never started fluoxetine. Has resources for therapists.     Still has chronic right hip pain. Wondering if she should try PT.     Still taking pregabalin.     Last pap: negative cytology in 04/2019   Last mammogram: 03/2024 and negative aside from extremely dense breasts. Rec'd supplementing annual screening mammo with whole breast ultrasound.   Previous colonoscopy: 09/2024 with Dr. Mejia showing two polyps, tortuous colon, and internal hemorrhoids. Rec'd repeat in 3 years.  Last DEXA: Never  Family hx of breast, ovarian, cervical or colon CA: See FH  Diet and exercise: Runs intermittently. Vegetarian diet.   Immunizations:  To consider Tdap, PCV20, and Shingrix.    History/Other:   Fall Risk Assessment:   She has been screened for Falls and is low risk.      Cognitive Assessment:   She had a completely normal cognitive assessment - see flowsheet entries     Functional Ability/Status:   iWnnie Cruz has some abnormal functions as listed below:  She has Hearing problems based on screening of functional status.She has Vision problems based on screening of functional status.       Depression Screening (PHQ):            Advanced Directives:   She does NOT have a Living Will. [Do you have a living will?: No]  She does NOT have a Power of  for Health Care. [Do you have a healthcare power of ?: No]  Not discussed      Patient Active Problem List   Diagnosis    Cervical myofascial pain  syndrome    DDD (degenerative disc disease), cervical    Family history of colon cancer in father    Vegetarian    Tinnitus of left ear    Trigger ring finger of right hand    Carpal tunnel syndrome of right wrist    Stress incontinence    Chronic bilateral low back pain without sciatica    Trigger finger of all digits of right hand    Basal cell carcinoma (BCC) of skin of left upper lip    Dense breasts    Chronic idiopathic constipation    Polyp of ascending colon    Herpes zoster without complication     Allergies:  She is allergic to cipro xr [ciprofloxacin-cipro hcl- cipro betaine].    Current Medications:  Outpatient Medications Marked as Taking for the 3/13/25 encounter (Office Visit) with Imtiaz Gupta, DO   Medication Sig    pregabalin 75 MG Oral Cap Take 1 capsule (75 mg total) by mouth daily.    cholecalciferol 25 MCG (1000 UT) Oral Cap Take 1 capsule (1,000 Units total) by mouth daily.    Multiple Vitamins-Minerals (CENTRUM) Oral Tab Take 1 tablet by mouth daily.       Medical History:  She  has a past medical history of Cancer ([facility]) (2016), Cervical myofascial pain syndrome (05/02/2013), Chronic pain, Colon polyps (2024), COVID-19 (05/2022), DDD (degenerative disc disease), cervical (08/29/2014), Hearing impairment, Osteoarthritis, OTHER DISEASES, and Visual impairment.  Surgical History:  She  has a past surgical history that includes colonoscopy,diagnostic (07/25/2011); oral surgery (1984); lipoma removal (Left, 2012); biopsy of skin lesion (2012); and colonoscopy (N/A, 9/18/2024).   Family History:  Her family history includes Cancer in her maternal grandmother; Cancer (age of onset: 67) in her father; IBS in her sister; Osteoporosis in her sister; Pacemaker in her father; Prostate Cancer in her father; Stroke in her brother and father; alzheimers in her mother; aphasia in her brother.  Social History:  She  reports that she has never smoked. She has never used smokeless tobacco. She reports that  she does not drink alcohol and does not use drugs.    Tobacco:       CAGE Alcohol Screen:   CAGE screening score of 0 on 3/13/2025, showing low risk of alcohol abuse.      Patient Care Team:  Imtiaz Gupta DO as PCP - General (Family Medicine)    Review of Systems  GENERAL: feels well otherwise  SKIN: denies any unusual skin lesions  EYES: denies blurred vision or double vision  HEENT: denies nasal congestion, sinus pain or ST  LUNGS: denies shortness of breath with exertion  CARDIOVASCULAR: denies chest pain on exertion  GI: denies abdominal pain, denies heartburn  : denies dysuria, vaginal discharge or itching, no complaint of urinary incontinence   MUSCULOSKELETAL: see HPI  NEURO: denies headaches  PSYCHE: see HPI  HEMATOLOGIC: denies hx of anemia  ENDOCRINE: denies thyroid history  ALL/ASTHMA: denies hx of asthma    Objective:   Physical Exam  General Appearance:  Alert, cooperative, no distress, appears stated age   Head:  Normocephalic, without obvious abnormality, atraumatic   Eyes:  PERRL, conjunctiva/corneas clear, EOM's intact both eyes   Ears:  Normal TM's and external ear canals, both ears   Nose: Nares normal, septum midline,mucosa normal, no drainage or sinus tenderness   Throat: Lips, mucosa, and tongue normal; teeth and gums normal   Neck: Supple, symmetrical, trachea midline, no adenopathy;  thyroid: not enlarged, symmetric, no tenderness/mass/nodules; no carotid bruit or JVD   Back:   Symmetric, no curvature, ROM normal, no CVA tenderness   Lungs:   Clear to auscultation bilaterally, respirations unlabored   Heart:  Regular rate and rhythm, S1 and S2 normal, no murmur, rub, or gallop   Abdomen:   Soft, non-tender, bowel sounds active all four quadrants,  no masses, no organomegaly   Pelvic: Deferred   Extremities: Extremities normal, atraumatic, no cyanosis or edema   Pulses: 2+ and symmetric   Skin: Skin color, texture, turgor normal, no rashes or lesions   Lymph nodes: Cervical,  supraclavicular, and axillary nodes normal   Neurologic: Normal       /62   Pulse 78   Ht 5' 3\" (1.6 m)   Wt 117 lb (53.1 kg)   SpO2 98%   BMI 20.73 kg/m²  Estimated body mass index is 20.73 kg/m² as calculated from the following:    Height as of this encounter: 5' 3\" (1.6 m).    Weight as of this encounter: 117 lb (53.1 kg).    Medicare Hearing Assessment:   Hearing Screening    Screening Method: Finger Rub  Finger Rub Result: Pass               Assessment & Plan:   Winnie Cruz is a 64 year old female who presents for a Medicare Assessment.     1. Encounter for annual health examination (Primary)  -     Hemoglobin A1C; Future; Expected date: 03/13/2025  -     CBC With Differential With Platelet; Future; Expected date: 03/13/2025  -     Comp Metabolic Panel (14); Future; Expected date: 03/13/2025  -     Ferritin; Future; Expected date: 03/13/2025  -     Iron And Tibc; Future; Expected date: 03/13/2025  -     Lipid Panel; Future; Expected date: 03/13/2025  -     Vitamin D; Future; Expected date: 03/13/2025  2. Encounter for screening mammogram for malignant neoplasm of breast  -     ADAMS ERICK 2D+3D SCREENING BILAT (CPT=77067/53033); Future; Expected date: 03/13/2025  3. Extremely dense tissue of both breasts on mammography  -     ADAMS ERICK 2D+3D SCREENING BILAT (CPT=77067/30609); Future; Expected date: 03/13/2025  -     US BREAST BILATERAL COMPLETE (CPT=76641-50); Future; Expected date: 03/13/2025  4. Postmenopausal  -     XR DEXA BONE DENSITOMETRY (CPT=77080); Future; Expected date: 03/13/2025  5. Chronic pain syndrome  6. Chronic right hip pain  -     Physical Therapy Referral - External  7. Prediabetes  -     Hemoglobin A1C; Future; Expected date: 03/13/2025  8. Vegetarian diet  -     CBC With Differential With Platelet; Future; Expected date: 03/13/2025  -     Ferritin; Future; Expected date: 03/13/2025  -     Iron And Tibc; Future; Expected date: 03/13/2025  -     Vitamin D; Future; Expected date:  03/13/2025  9. Skin lesion  -     Derm Referral - In Network  10. Normocytic anemia  -     CBC With Differential With Platelet; Future; Expected date: 03/13/2025  -     Ferritin; Future; Expected date: 03/13/2025  -     Iron And Tibc; Future; Expected date: 03/13/2025  11. Vitamin D deficiency  -     Vitamin D; Future; Expected date: 03/13/2025  12. Anxiety and depression    -Chronic right hip pain: Recommend PT. Referral generated.   -Chronic pain syndome: On pregabalin.  -Anxiety & depression: Recommend CBT. Never started fluoxetine but will consider.  -Prediabetes: Lifestyle changes. F/u labs.   -To establish with new dermatologist.  -Annual labs ordered.  -Due for mammogram and breast ultrasound.   -Due for DEXA.    The patient indicates understanding of these issues and agrees to the plan.  Imaging studies ordered.  Lab work ordered.  Reinforced healthy diet, lifestyle, and exercise.      No follow-ups on file.     Imtiaz Gupta DO, 3/13/2025     Supplementary Documentation:   General Health:  In the past six months, have you lost more than 10 pounds without trying?: 3 - Don't know  Has your appetite been poor?: Yes  Type of Diet: Vegetarian, Other  How does the patient maintain a good energy level?:  (declined)  How would you describe your daily physical activity?: Moderate  How would you describe your current health state?: Fair  How do you maintain positive mental well-being?: Visiting Family, Visiting Friends, Games  On a scale of 0 to 10, with 0 being no pain and 10 being severe pain, what is your pain level?: 2 - (Mild)  In the past six months, have you experienced urine leakage?: 1-Yes  At any time do you feel concerned for the safety/well-being of yourself and/or your children, in your home or elsewhere?: No  Have you had any immunizations at another office such as Influenza, Hepatitis B, Tetanus, or Pneumococcal?: No    Health Maintenance   Topic Date Due    Pneumococcal Vaccine: 50+ Years (1 of 1 -  PCV) Never done    Zoster Vaccines (1 of 2) Never done    DEXA Scan  Never done    COVID-19 Vaccine (4 - 2024-25 season) 09/01/2024    Influenza Vaccine (1) 10/01/2024    Annual Depression Screening  01/01/2025    Fall Risk Screening (Annual)  01/01/2025    Annual Physical  01/23/2025    Mammogram  03/01/2025    Colorectal Cancer Screening  09/18/2027    Meningococcal B Vaccine  Aged Out

## 2025-03-14 DIAGNOSIS — Z00.00 ANNUAL PHYSICAL EXAM: Primary | ICD-10-CM

## 2025-03-14 LAB — TSI SER-ACNC: 1.77 UIU/ML (ref 0.55–4.78)

## 2025-05-14 DIAGNOSIS — G89.4 CHRONIC PAIN SYNDROME: ICD-10-CM

## 2025-05-14 RX ORDER — PREGABALIN 75 MG/1
75 CAPSULE ORAL DAILY
Qty: 90 CAPSULE | Refills: 1 | Status: SHIPPED | OUTPATIENT
Start: 2025-05-14

## 2025-05-14 NOTE — TELEPHONE ENCOUNTER
Please review. Protocol Failed; No Protocol      Recent fills: 11/18/2024, 2/14/2025  Last Rx written: 2/14/2025  Last office visit: 3/13/2025

## 2025-06-24 ENCOUNTER — TELEPHONE (OUTPATIENT)
Facility: CLINIC | Age: 65
End: 2025-06-24

## 2025-06-24 DIAGNOSIS — H93.90 EAR PROBLEM, UNSPECIFIED LATERALITY: Primary | ICD-10-CM

## 2025-06-24 NOTE — TELEPHONE ENCOUNTER
No we have not received results.   Recommend RN visit for TDAP vaccine.   Referral signed.   That is okay.

## 2025-06-24 NOTE — TELEPHONE ENCOUNTER
Spoke with patient (verified name and  ) .   Informed DR Gupta's recommendation .    1.Instructed to contact [facility] and request to fax the result to the office .    2, ENT information provided ;  Referred to Provider Information:  Provider Address Phone   Elizabeth Vaughan MD [address] [place] [id number] [phone]       3. She plans to receive the vaccine from her pharmacy due to insurance issues=RN  instructed her to provide us with the information below once she receives the vaccine from her pharmacy: LOT number, EXP date, pharmacy name/location, and .  4. It's okay to drink mineral water.      NO QUESTION AT THIS TIME.    No future appointments.

## 2025-06-24 NOTE — TELEPHONE ENCOUNTER
1)Pt requesting ADAMS results - stated it was done 6/14/25 at  [facility]   Was results received       2) mentioned -she have a new baby in the family -great niece- asking which vaccine she need, have not had Tetanus in 30 years , asking for tdap or what is recommended     3)ENT- referral -ear issues , stated left ear - is worse more crackling-stated Dr is aware of the s/s     4)asking if ok to drink Mineral water- only drink 4 cups daily, concern if that will be too much

## 2025-07-24 ENCOUNTER — TELEPHONE (OUTPATIENT)
Facility: CLINIC | Age: 65
End: 2025-07-24

## 2025-07-24 DIAGNOSIS — M81.0 AGE-RELATED OSTEOPOROSIS WITHOUT CURRENT PATHOLOGICAL FRACTURE: Primary | ICD-10-CM

## 2025-07-24 NOTE — TELEPHONE ENCOUNTER
The result report was already sent for scanning so I don't have the report anymore. However, this will be available once scanned into chart in about 2 weeks.

## 2025-07-24 NOTE — TELEPHONE ENCOUNTER
I received DEXA scan results from last month showing osteoporosis in right and left femur, and osteopenia elsewhere. I recommend seeing endocrinology to discuss treatment options. Please let pt know and provide info for Dr. Beck. Referral generated.

## 2025-07-24 NOTE — TELEPHONE ENCOUNTER
Spoke to patient and relayed Dr. Gupta's message below. Patient verbalized understanding. She wonders where the osteopenia is at in her body. Rn relayed I don't have the report in front of me. She runs so is wondering if there is any chance the test is not accurate. She also asked if there was a rating as to if the osteoporosis/osteopenia is mild/moderate/severe.     RN did provide referral information.    Dr. Gupta, please advise on patient's follow up questions.

## 2025-07-24 NOTE — TELEPHONE ENCOUNTER
Spoke to patient and relayed Dr. Gupta's message below. Patient verbalized understanding.     Rn will postpone for 2 weeks. Please check chart to make sure report is present and send back to Dr. Gupta, per patient request.

## (undated) DIAGNOSIS — Z12.31 VISIT FOR SCREENING MAMMOGRAM: Primary | ICD-10-CM

## (undated) DEVICE — LASSO POLYPECTOMY SNARE: Brand: LASSO

## (undated) DEVICE — CO2 CANNULA,SSOFT,ADLT,7O2,4CO2,FEMALE: Brand: MEDLINE

## (undated) DEVICE — KIT CLEAN ENDOKIT 1.1OZ GOWNX2

## (undated) DEVICE — MEDI-VAC NON-CONDUCTIVE SUCTION TUBING 6MM X 1.8M (6FT.) L: Brand: CARDINAL HEALTH

## (undated) DEVICE — KIT MFLD FOR SPEC COLL

## (undated) DEVICE — V2 SPECIMEN COLLECTION TRAY: Brand: NEPTUNE

## (undated) DEVICE — NEEDLE INJ 25GA CATH 230CM CHN 2.8MM ACUJECT

## (undated) DEVICE — KIT ENDO ORCAPOD 160/180/190

## (undated) DEVICE — SYRINGE, LUER SLIP, STERILE, 60ML: Brand: MEDLINE

## (undated) NOTE — MR AVS SNAPSHOT
After Visit Summary   3/24/2023    Morenita Burn   MRN: [id number]           Visit Information     Date & Time  3/24/2023  9:45 AM Provider  Kris Espinoza MD Department  [facility], [address], [facility]/[employer]. Phone  [phone]      Allergies as of 3/24/2023  Review status set to Review Complete on 3/24/2023       Noted Reaction Type Reactions    Cipro Xr [ciprofloxacin-cipro Hcl- Cipro Betaine] 07/25/2011          Your Current Medications        Dosage    Na Sulfate-K Sulfate-Mg Sulf (SUPREP BOWEL PREP KIT) 17.5-3.13-1.6 GM/177ML Oral Solution Take prep as directed by gastro office. May substitute with Trilyte/generic equivalent if needed. pregabalin 75 MG Oral Cap Take 1 capsule (75 mg total) by mouth daily. cholecalciferol 25 MCG (1000 UT) Oral Cap Take by mouth. Multiple Vitamins-Minerals (CENTRUM) Oral Tab 1 DAILY      Diagnoses for This Visit    [address] (basal cell carcinoma), face   [[id number]]  -  Primary           We Ordered the Following     Normal Orders This Visit    SURGICAL PATHOLOGY TISSUE [[id number] CUSTOM]     Future Labs/Procedures Expected by Expires    SURGICAL PATHOLOGY TISSUE [[id number] CUSTOM]  3/24/2023 (Approximate) 3/23/2024      Future Appointments        Provider Department    3/31/2023 10:00 AM LISA [employer] MARRY BREWSTER [facility], [address], [place]    4/19/2023 11:15 AM Tammie Amaya, PROCEDURE [facility], [address], [place]       March 25, 2023      [address] [address]     Dear Amee Whelan : Thank you for enrolling in [facility] [address]. Please follow the instructions below to securely access your online medical record. [employer] allows you to send messages to your doctor, view test results, renew prescriptions and request appointments. How Do I Sign Up? 1. In your Internet browser, go to [email or web address]  2.  Click on the Activate your Account if you have an activation code in the box under the *New User? section. 3. Enter your [employer] Activation Code exactly as it appears below. You will not need to use this code after you have completed the sign-up process. If you do not sign up before the expiration date, you must request a new code. [employer] Activation Code: [email or web address]  Expires: 5/8/2023  9:15 AM    4. Enter your Zip Code and Date of Birth (mm/dd/yyyy) as indicated and click Next. You will be taken to the next sign-up page. 5. Create a [employer] Username. This will be your [employer] login Username and cannot be changed, so think of one that is secure and easy to remember. 6. Create a [employer] password. You can change your password at any time. 7. Choose a Security Question and enter your Answer and click Next. This can be used at a later time if you forget your password. 8. Enter your e-mail address. You will receive e-mail notification when new information is available in [address] [place] [address]. 9. Click Sign In. You can now view your medical record. Additional Information  If you have questions, you can call [phone] to talk to our [facility] [address] [facility] staff. Remember, [employer] is NOT to be used for urgent needs. For medical emergencies, dial 911. Sincerely,    Kelechi Barrientos MD              Did you know that [facility] primary care physicians now offer Video Visits through [facility] for adult patients for a variety of conditions such as allergies, back pain and cold symptoms? Skip the drive and waiting room and online chat with a doctor face-to-face using your web-cam enabled computer or mobile device wherever you are. Video Visits cost $50 and can be paid hassle-free using a credit, debit, or health savings card. Not active on [employer]? Ask us how to get signed up today! If you receive a survey from [employer], please take a few minutes to complete it and provide feedback. We strive to deliver the best patient experience and are looking for ways to make improvements.  Your feedback will help us do so. For more information on [employer], please visit [email or web address]."[email or web address]. [email or web address]           No text in SmartText           No text in SmartText

## (undated) NOTE — LETTER
[facility]  [address], [place], [place]    Authorization for Surgical Operation and Procedure                               I hereby authorize JESSE Mejia MD, my physician and his/her assistants (if applicable), which may include medical students, residents, and/or fellows, to perform the following surgical operation/ procedure and administer such anesthesia as may be determined necessary by my physician: Operation/Procedure name (s) COLONOSCOPY on Winnie Cruz   2.   I recognize that during the surgical operation/procedure, unforeseen conditions may necessitate additional or different procedures than those listed above.  I, therefore, further authorize and request that the above-named surgeon, assistants, or designees perform such procedures as are, in their judgment, necessary and desirable.    3.   My surgeon/physician has discussed prior to my surgery the potential benefits, risks and side effects of this procedure; the likelihood of achieving goals; and potential problems that might occur during recuperation.  They also discussed reasonable alternatives to the procedure, including risks, benefits, and side effects related to the alternatives and risks related to not receiving this procedure.  I have had all my questions answered and I acknowledge that no guarantee has been made as to the result that may be obtained.    4.   Should the need arise during my operation/procedure, which includes change of level of care prior to discharge, I also consent to the administration of blood and/or blood products.  Further, I understand that despite careful testing and screening of blood or blood products by collecting agencies, I may still be subject to ill effects as a result of receiving a blood transfusion and/or blood products.  The following are some, but not all, of the potential risks that can occur: fever and allergic reactions, hemolytic reactions, transmission of diseases such as  Hepatitis, AIDS and Cytomegalovirus (CMV) and fluid overload.  In the event that I wish to have an autologous transfusion of my own blood, or a directed donor transfusion, I will discuss this with my physician.  Check only if Refusing Blood or Blood Products  I understand refusal of blood or blood products as deemed necessary by my physician may have serious consequences to my condition to include possible death. I hereby assume responsibility for my refusal and release the hospital, its personnel, and my physicians from any responsibility for the consequences of my refusal.    o  Refuse   5.   I authorize the use of any specimen, organs, tissues, body parts or foreign objects that may be removed from my body during the operation/procedure for diagnosis, research or teaching purposes and their subsequent disposal by hospital authorities.  I also authorize the release of specimen test results and/or written reports to my treating physician on the hospital medical staff or other referring or consulting physicians involved in my care, at the discretion of the Pathologist or my treating physician.    6.   I consent to the photographing or videotaping of the operations or procedures to be performed, including appropriate portions of my body for medical, scientific, or educational purposes, provided my identity is not revealed by the pictures or by descriptive texts accompanying them.  If the procedure has been photographed/videotaped, the surgeon will obtain the original picture, image, videotape or CD.  The hospital will not be responsible for storage, release or maintenance of the picture, image, tape or CD.    7.   I consent to the presence of a  or observers in the operating room as deemed necessary by my physician or their designees.    8.   I recognize that in the event my procedure results in extended X-Ray/fluoroscopy time, I may develop a skin reaction.    9. If I have a Do Not Attempt  Resuscitation (DNAR) order in place, that status will be suspended while in the operating room, procedural suite, and during the recovery period unless otherwise explicitly stated by me (or a person authorized to consent on my behalf). The surgeon or my attending physician will determine when the applicable recovery period ends for purposes of reinstating the DNAR order.  10. Patients having a sterilization procedure: I understand that if the procedure is successful the results will be permanent and it will therefore be impossible for me to inseminate, conceive, or bear children.  I also understand that the procedure is intended to result in sterility, although the result has not been guaranteed.   11. I acknowledge that my physician has explained sedation/analgesia administration to me including the risk and benefits I consent to the administration of sedation/analgesia as may be necessary or desirable in the judgment of my physician.    I CERTIFY THAT I HAVE READ AND FULLY UNDERSTAND THE ABOVE CONSENT TO OPERATION and/or OTHER PROCEDURE.     ____________________________________  _________________________________        ______________________________  Signature of Patient    Signature of Responsible Person                Printed Name of Responsible Person                                      ____________________________________  _____________________________                ________________________________  Signature of Witness        Date  Time         Relationship to Patient    STATEMENT OF PHYSICIAN My signature below affirms that prior to the time of the procedure; I have explained to the patient and/or his/her legal representative, the risks and benefits involved in the proposed treatment and any reasonable alternative to the proposed treatment. I have also explained the risks and benefits involved in refusal of the proposed treatment and alternatives to the proposed treatment and have answered the patient's  questions. If I have a significant financial interest in a co-management agreement or a significant financial interest in any product or implant, or other significant relationship used in this procedure/surgery, I have disclosed this and had a discussion with my patient.     _____________________________________________________              _____________________________  (Signature of Physician)                                                                                         (Date)                                   (Time)  Patient Name: Winnie Cruz      : 1960      Printed: 2024     Medical Record #: [id number]                                      Page 1 of 1

## (undated) NOTE — LETTER
[facility]  Administration of Anesthesia  I, Winnie rCuz agree to be cared for by a physician anesthesiologist alone and/or with a nurse anesthetist, who is specially trained to monitor me and give me medicine to put me to sleep or keep me comfortable during my procedure    I understand that my anesthesiologist and/or anesthetist is not an employee or agent of [facility] or [employer]. He or she works for [facility]    As the patient asking for anesthesia services, I agree to:  Allow the anesthesiologist (anesthesia doctor) to give me medicine and do additional procedures as necessary. Some examples are: Starting or using an “IV” to give me medicine, fluids or blood during my procedure, and having a breathing tube placed to help me breathe when I’m asleep (intubation). In the event that my heart stops working properly, I understand that my anesthesiologist will make every effort to sustain my life, unless otherwise directed by [facility] Do Not Resuscitate documents.  Tell my anesthesia doctor before my procedure:  If I am pregnant.  The last time that I ate or drank.  iii. All of the medicines I take (including prescriptions, herbal supplements, and pills I can buy without a prescription (including street drugs/illegal medications). Failure to inform my anesthesiologist about these medicines may increase my risk of anesthetic complications.  iv.If I am allergic to anything or have had a reaction to anesthesia before.  I understand how the anesthesia medicine will help me (benefits).  I understand that with any type of anesthesia medicine there are risks:  The most common risks are: nausea, vomiting, sore throat, muscle soreness, damage to my eyes, mouth, or teeth (from breathing tube placement).  Rare risks include: remembering what happened during my procedure, allergic reactions to medications, injury to my airway, heart, lungs, vision, nerves, or  muscles and in extremely rare instances death.  My doctor has explained to me other choices available to me for my care (alternatives).  Pregnant Patients (“epidural”):  I understand that the risks of having an epidural (medicine given into my back to help control pain during labor), include itching, low blood pressure, difficulty urinating, headache or slowing of the baby’s heart. Very rare risks include infection, bleeding, seizure, irregular heart rhythms and nerve injury.  Regional Anesthesia (“spinal”, “epidural”, & “nerve blocks”):  I understand that rare but potential complications include headache, bleeding, infection, seizure, irregular heart rhythms, and nerve injury.    _____________________________________________________________________________  Patient (or Representative) Signature/Relationship to Patient  Date   Time    _____________________________________________________________________________   Name (if used)    Language/Organization   Time    _____________________________________________________________________________  Nurse Anesthetist Signature     Date   Time  _____________________________________________________________________________  Anesthesiologist Signature     Date   Time  I have discussed the procedure and information above with the patient (or patient’s representative) and answered their questions. The patient or their representative has agreed to have anesthesia services.    _____________________________________________________________________________  Witness        Date   Time  I have verified that the signature is that of the patient or patient’s representative, and that it was signed before the procedure  Patient Name: Winnie Cruz     : 1960                 Printed: 2024 at 7:10 AM    Medical Record #: [id number]                                            Page 1 of 1  ----------ANESTHESIA CONSENT----------